# Patient Record
Sex: FEMALE | Race: WHITE | NOT HISPANIC OR LATINO | Employment: UNEMPLOYED | URBAN - METROPOLITAN AREA
[De-identification: names, ages, dates, MRNs, and addresses within clinical notes are randomized per-mention and may not be internally consistent; named-entity substitution may affect disease eponyms.]

---

## 2017-01-13 ENCOUNTER — ALLSCRIPTS OFFICE VISIT (OUTPATIENT)
Dept: OTHER | Facility: OTHER | Age: 57
End: 2017-01-13

## 2017-01-13 DIAGNOSIS — E78.5 HYPERLIPIDEMIA: ICD-10-CM

## 2017-01-13 DIAGNOSIS — Z12.11 ENCOUNTER FOR SCREENING FOR MALIGNANT NEOPLASM OF COLON: ICD-10-CM

## 2017-05-15 DIAGNOSIS — Z12.31 ENCOUNTER FOR SCREENING MAMMOGRAM FOR MALIGNANT NEOPLASM OF BREAST: ICD-10-CM

## 2018-01-13 VITALS
DIASTOLIC BLOOD PRESSURE: 66 MMHG | TEMPERATURE: 97.5 F | WEIGHT: 208 LBS | HEART RATE: 64 BPM | BODY MASS INDEX: 40.84 KG/M2 | RESPIRATION RATE: 16 BRPM | HEIGHT: 60 IN | SYSTOLIC BLOOD PRESSURE: 104 MMHG

## 2018-02-16 RX ORDER — AMLODIPINE BESYLATE 10 MG/1
TABLET ORAL
Qty: 30 TABLET | Refills: 0 | OUTPATIENT
Start: 2018-02-16

## 2018-02-16 RX ORDER — POTASSIUM CHLORIDE 20 MEQ/1
TABLET, EXTENDED RELEASE ORAL
Qty: 30 TABLET | Refills: 0 | OUTPATIENT
Start: 2018-02-16

## 2018-02-16 RX ORDER — ATORVASTATIN CALCIUM 20 MG/1
TABLET, FILM COATED ORAL
Qty: 30 TABLET | Refills: 0 | OUTPATIENT
Start: 2018-02-16

## 2018-02-16 RX ORDER — QUINAPRIL 40 MG/1
TABLET ORAL
Qty: 30 TABLET | Refills: 0 | OUTPATIENT
Start: 2018-02-16

## 2018-03-14 ENCOUNTER — OFFICE VISIT (OUTPATIENT)
Dept: FAMILY MEDICINE CLINIC | Facility: CLINIC | Age: 58
End: 2018-03-14
Payer: COMMERCIAL

## 2018-03-14 VITALS
HEART RATE: 76 BPM | RESPIRATION RATE: 12 BRPM | BODY MASS INDEX: 41.7 KG/M2 | TEMPERATURE: 99.1 F | DIASTOLIC BLOOD PRESSURE: 78 MMHG | SYSTOLIC BLOOD PRESSURE: 110 MMHG | HEIGHT: 60 IN | WEIGHT: 212.4 LBS

## 2018-03-14 DIAGNOSIS — Z12.39 SCREENING FOR BREAST CANCER: ICD-10-CM

## 2018-03-14 DIAGNOSIS — R60.9 EDEMA, UNSPECIFIED TYPE: ICD-10-CM

## 2018-03-14 DIAGNOSIS — Z13.29 SCREENING FOR THYROID DISORDER: ICD-10-CM

## 2018-03-14 DIAGNOSIS — E55.9 VITAMIN D DEFICIENCY: ICD-10-CM

## 2018-03-14 DIAGNOSIS — Z13.31 POSITIVE DEPRESSION SCREENING: ICD-10-CM

## 2018-03-14 DIAGNOSIS — E78.5 HYPERLIPIDEMIA, UNSPECIFIED HYPERLIPIDEMIA TYPE: ICD-10-CM

## 2018-03-14 DIAGNOSIS — Z13.1 SCREENING FOR DIABETES MELLITUS: ICD-10-CM

## 2018-03-14 DIAGNOSIS — R53.83 FATIGUE, UNSPECIFIED TYPE: ICD-10-CM

## 2018-03-14 DIAGNOSIS — M79.7 FIBROMYALGIA: Primary | ICD-10-CM

## 2018-03-14 DIAGNOSIS — Z13.0 SCREENING FOR DEFICIENCY ANEMIA: ICD-10-CM

## 2018-03-14 DIAGNOSIS — I10 HYPERTENSION, UNSPECIFIED TYPE: ICD-10-CM

## 2018-03-14 PROBLEM — K04.7 TOOTH ABSCESS: Status: ACTIVE | Noted: 2017-06-08

## 2018-03-14 PROCEDURE — 99214 OFFICE O/P EST MOD 30 MIN: CPT | Performed by: NURSE PRACTITIONER

## 2018-03-14 RX ORDER — ATORVASTATIN CALCIUM 20 MG/1
1 TABLET, FILM COATED ORAL DAILY
COMMUNITY
Start: 2016-04-19 | End: 2018-03-14 | Stop reason: SDUPTHER

## 2018-03-14 RX ORDER — INCONTINENCE PAD,LINER,DISP
EACH MISCELLANEOUS
COMMUNITY
Start: 2016-09-01 | End: 2018-03-14 | Stop reason: ALTCHOICE

## 2018-03-14 RX ORDER — QUINAPRIL 40 MG/1
1 TABLET ORAL DAILY
COMMUNITY
Start: 2016-04-19 | End: 2018-03-14 | Stop reason: SDUPTHER

## 2018-03-14 RX ORDER — POTASSIUM CHLORIDE 20 MEQ/1
1 TABLET, EXTENDED RELEASE ORAL DAILY
COMMUNITY
Start: 2017-02-23 | End: 2018-03-14 | Stop reason: SDUPTHER

## 2018-03-14 RX ORDER — QUINAPRIL 40 MG/1
40 TABLET ORAL DAILY
Qty: 30 TABLET | Refills: 5 | Status: SHIPPED | OUTPATIENT
Start: 2018-03-14 | End: 2018-05-02 | Stop reason: ALTCHOICE

## 2018-03-14 RX ORDER — FUROSEMIDE 40 MG/1
1 TABLET ORAL DAILY
COMMUNITY
Start: 2017-03-23 | End: 2018-03-14 | Stop reason: SDUPTHER

## 2018-03-14 RX ORDER — MELOXICAM 15 MG/1
1 TABLET ORAL DAILY
COMMUNITY
Start: 2016-04-19 | End: 2018-03-14 | Stop reason: ALTCHOICE

## 2018-03-14 RX ORDER — METOPROLOL SUCCINATE 100 MG/1
100 TABLET, EXTENDED RELEASE ORAL DAILY
Qty: 30 TABLET | Refills: 5 | Status: SHIPPED | OUTPATIENT
Start: 2018-03-14 | End: 2018-08-22 | Stop reason: SDUPTHER

## 2018-03-14 RX ORDER — FUROSEMIDE 40 MG/1
40 TABLET ORAL DAILY
Qty: 30 TABLET | Refills: 5 | Status: SHIPPED | OUTPATIENT
Start: 2018-03-14 | End: 2018-08-22 | Stop reason: SDUPTHER

## 2018-03-14 RX ORDER — ATORVASTATIN CALCIUM 20 MG/1
20 TABLET, FILM COATED ORAL DAILY
Qty: 30 TABLET | Refills: 5 | Status: SHIPPED | OUTPATIENT
Start: 2018-03-14 | End: 2018-08-22 | Stop reason: SDUPTHER

## 2018-03-14 RX ORDER — POTASSIUM CHLORIDE 20 MEQ/1
20 TABLET, EXTENDED RELEASE ORAL DAILY
Qty: 30 TABLET | Refills: 5 | Status: SHIPPED | OUTPATIENT
Start: 2018-03-14 | End: 2018-08-22 | Stop reason: SDUPTHER

## 2018-03-14 RX ORDER — AMLODIPINE BESYLATE 10 MG/1
1 TABLET ORAL DAILY
COMMUNITY
Start: 2016-05-23 | End: 2018-03-14 | Stop reason: SDUPTHER

## 2018-03-14 RX ORDER — ERGOCALCIFEROL 1.25 MG/1
1 CAPSULE ORAL WEEKLY
COMMUNITY
Start: 2016-11-04 | End: 2018-03-14 | Stop reason: ALTCHOICE

## 2018-03-14 RX ORDER — AMLODIPINE BESYLATE 10 MG/1
10 TABLET ORAL DAILY
Qty: 30 TABLET | Refills: 5 | Status: SHIPPED | OUTPATIENT
Start: 2018-03-14 | End: 2018-08-22 | Stop reason: SDUPTHER

## 2018-03-14 RX ORDER — METOPROLOL SUCCINATE 100 MG/1
1 TABLET, EXTENDED RELEASE ORAL DAILY
COMMUNITY
Start: 2017-07-21 | End: 2018-03-14 | Stop reason: SDUPTHER

## 2018-03-14 NOTE — PROGRESS NOTES
Assessment/Plan:    No problem-specific Assessment & Plan notes found for this encounter  Diagnoses and all orders for this visit:    Fibromyalgia  Comments:  Pt has trigger point pain will continue to discuss management following labwork completion    Screening for deficiency anemia  Comments:  Labwork ordered  Orders:  -     CBC and differential; Future    Screening for diabetes mellitus  Comments:  Labwork ordered  Orders:  -     Comprehensive metabolic panel; Future    Hypertension, unspecified type  Comments:  /78 pt managed on 3 agents, Accupril, Metoprolol and Norvasc  Orders:  -     Comprehensive metabolic panel; Future  -     UA (URINE) with reflex to Microscopic  -     amLODIPine (NORVASC) 10 mg tablet; Take 1 tablet (10 mg total) by mouth daily  -     metoprolol succinate (TOPROL-XL) 100 mg 24 hr tablet; Take 1 tablet (100 mg total) by mouth daily  -     quinapril (ACCUPRIL) 40 MG tablet; Take 1 tablet (40 mg total) by mouth daily    Hyperlipidemia, unspecified hyperlipidemia type  Comments:  Pt managed on Atorvastatin  Labwork ordered  Orders:  -     Lipid panel; Future  -     atorvastatin (LIPITOR) 20 mg tablet; Take 1 tablet (20 mg total) by mouth daily    Screening for thyroid disorder  Comments:  Labwork ordered  Orders:  -     TSH, 3rd generation with T4 reflex; Future    Vitamin D deficiency  Comments:  Labwork ordered  Orders:  -     Vitamin D 25 hydroxy; Future    Fatigue, unspecified type  Comments:  Labwork ordered  Orders:  -     UA (URINE) with reflex to Microscopic    Screening for breast cancer  Comments:  mammogram ordered  Orders:  -     Mammo screening bilateral w cad; Future    Edema, unspecified type  Comments:  Pt reports history of unknown cardiac condition, need her medical records  Pt maintained on Lasix and Potassium  Orders:  -     furosemide (LASIX) 40 mg tablet; Take 1 tablet (40 mg total) by mouth daily  -     potassium chloride (K-DUR,KLOR-CON) 20 mEq tablet;  Take 1 tablet (20 mEq total) by mouth daily    Positive depression screening  Comments:  Discussed use of antidepressants but patient would like to think about it  Pt refusing counseling because she has no transporatation , no car  Other orders  -     Discontinue: amLODIPine (NORVASC) 10 mg tablet; Take 1 tablet by mouth daily  -     Discontinue: atorvastatin (LIPITOR) 20 mg tablet; Take 1 tablet by mouth daily  -     Discontinue: furosemide (LASIX) 40 mg tablet; Take 1 tablet by mouth daily  -     Discontinue: meloxicam (MOBIC) 15 mg tablet; Take 1 tablet by mouth daily  -     Discontinue: metoprolol succinate (TOPROL-XL) 100 mg 24 hr tablet; Take 1 tablet by mouth daily  -     Discontinue: Incontinence Supply Disposable (POISE PANTILINERS) PADS; by Does not apply route  -     Discontinue: potassium chloride (K-DUR,KLOR-CON) 20 mEq tablet; Take 1 tablet by mouth daily  -     Discontinue: quinapril (ACCUPRIL) 40 MG tablet; Take 1 tablet by mouth daily  -     Discontinue: ergocalciferol (VITAMIN D2) 50,000 units; Take 1 capsule by mouth once a week          Subjective:      Patient ID: Vianney Sierra is a 61 y o  female  presents for 6 month f/u  chronic pain multiple joints, depression, hyperlipidemia,  and hypertension  Pt does not wish to take any medication stating she is worried about the long term effects on her liver and her kidneys  Pt continues to smoke 1 ppd  Pt has been applying for disability and recently was denied after 8 years  Pt reports she is unable to work due to chronic joint pain  , does not have a car and has limited transportation        Hypertension   Pertinent negatives include no chest pain, headaches, palpitations or shortness of breath  The following portions of the patient's history were reviewed and updated as appropriate:   She  has a past medical history of ; Hyperlipidemia; Hypertension; Seizures ([address] ); Thrombosis; and Vitamin D deficiency    She   Patient Active Problem List    Diagnosis Date Noted    Fibromyalgia 03/14/2018    Tooth abscess 06/08/2017    Positive depression screening 01/13/2017    Vitamin D insufficiency 11/04/2016    Bilateral chronic knee pain 09/30/2016    Knee osteoarthritis 09/30/2016    Low back pain potentially associated with radiculopathy 09/30/2016    Myalgia 09/30/2016    Urinary incontinence 09/01/2016    Acute bronchitis 07/06/2016    Tracheobronchitis 05/19/2016    Arthritis of back 04/19/2016    Benign essential hypertension 04/19/2016    Bilateral knee pain 04/19/2016    Chronic lower back pain 04/19/2016    Chronic obstructive pulmonary disease ([facility] [address] ) 04/19/2016    Disc degeneration, lumbar 04/19/2016    Fibrocystic breast disease, unspecified laterality 04/19/2016    Generalized osteoarthritis of multiple sites 04/19/2016    Herniated lumbar intervertebral disc 04/19/2016    Hyperlipidemia 04/19/2016    Menopause 04/19/2016    Obstructive sleep apnea 04/19/2016     She  has a past surgical history that includes Hysterectomy and Incisional breast biopsy  Her family history includes Breast cancer in her family and sister; Heart attack in her father; Hypertension in her mother; Lung cancer in her family; Stomach cancer in her mother  She  reports that she has been smoking Cigarettes  She has a 13 00 pack-year smoking history  She has never used smokeless tobacco  She reports that she drinks alcohol  She reports that she does not use drugs    Current Outpatient Prescriptions   Medication Sig Dispense Refill    amLODIPine (NORVASC) 10 mg tablet Take 1 tablet (10 mg total) by mouth daily 30 tablet 5    atorvastatin (LIPITOR) 20 mg tablet Take 1 tablet (20 mg total) by mouth daily 30 tablet 5    furosemide (LASIX) 40 mg tablet Take 1 tablet (40 mg total) by mouth daily 30 tablet 5    metoprolol succinate (TOPROL-XL) 100 mg 24 hr tablet Take 1 tablet (100 mg total) by mouth daily 30 tablet 5    potassium chloride (K-DUR,KLOR-CON) 20 mEq tablet Take 1 tablet (20 mEq total) by mouth daily 30 tablet 5    quinapril (ACCUPRIL) 40 MG tablet Take 1 tablet (40 mg total) by mouth daily 30 tablet 5     No current facility-administered medications for this visit  No current outpatient prescriptions on file prior to visit  No current facility-administered medications on file prior to visit  She is allergic to benadryl [diphenhydramine] and tetracycline       Review of Systems   Constitutional: Negative  Negative for fatigue  HENT: Negative  Negative for congestion, postnasal drip, rhinorrhea, sinus pain, sore throat and trouble swallowing  Eyes: Negative  Negative for pain and visual disturbance  Respiratory: Negative  Negative for cough, shortness of breath and wheezing  Cardiovascular: Negative  Negative for chest pain and palpitations  Gastrointestinal: Negative  Negative for constipation, diarrhea, nausea and vomiting  Endocrine: Negative  Negative for polydipsia, polyphagia and polyuria  Genitourinary: Negative  Negative for difficulty urinating, flank pain, frequency and pelvic pain  Musculoskeletal: Positive for arthralgias, back pain, joint swelling and myalgias  Skin: Negative  Negative for color change and rash  Allergic/Immunologic: Negative  Neurological: Negative  Negative for dizziness, syncope, weakness, light-headedness, numbness and headaches  Hematological: Negative  Negative for adenopathy  Does not bruise/bleed easily  Psychiatric/Behavioral: Positive for sleep disturbance  Negative for behavioral problems  The patient is not nervous/anxious  Objective:      /78 (BP Location: Left arm, Patient Position: Sitting, Cuff Size: Large)   Pulse 76   Temp 99 1 °F (37 3 °C) (Tympanic)   Resp 12   Ht 5' (1 524 m)   Wt 96 3 kg (212 lb 6 4 oz)   BMI 41 48 kg/m²          Physical Exam   Constitutional: She is oriented to person, place, and time   She appears well-developed and well-nourished  HENT:   Head: Normocephalic  Eyes: Pupils are equal, round, and reactive to light  Neck: Normal range of motion  Cardiovascular: Normal rate and regular rhythm  Pulmonary/Chest: Effort normal and breath sounds normal    Abdominal: Soft  Bowel sounds are normal    Musculoskeletal: Normal range of motion  Neurological: She is alert and oriented to person, place, and time  Skin: Skin is warm and dry  Psychiatric: Her behavior is normal  Judgment and thought content normal  She exhibits a depressed mood  Nursing note and vitals reviewed

## 2018-03-14 NOTE — PATIENT INSTRUCTIONS
Pregabalin (By mouth)   Pregabalin (pre-GA-ba-cresencio)  Treats nerve and muscle pain, including fibromyalgia  Also treats seizures  Brand Name(s): Lyrica   There may be other brand names for this medicine  When This Medicine Should Not Be Used: This medicine is not right for everyone  Do not use it if you had an allergic reaction to pregabalin  How to Use This Medicine:   Capsule, Liquid  · Take your medicine as directed  Your dose may need to be changed several times to find what works best for you  · Measure the oral liquid medicine with a marked measuring spoon, oral syringe, or medicine cup  · This medicine should come with a Medication Guide  Ask your pharmacist for a copy if you do not have one  · Missed dose: Take a dose as soon as you remember  If it is almost time for your next dose, wait until then and take a regular dose  Do not take extra medicine to make up for a missed dose  · Store the medicine in a closed container at room temperature, away from heat, moisture, and direct light  Drugs and Foods to Avoid:   Ask your doctor or pharmacist before using any other medicine, including over-the-counter medicines, vitamins, and herbal products  · Some medicines can affect how pregabalin works  Tell your doctor if you are using any of the following:   ¨ Diabetes medicine that you take by mouth (pioglitazone, rosiglitazone)  ¨ An ACE inhibitor (benazepril, enalapril, lisinopril, quinapril, ramipril)  · Tell your doctor if you use anything else that makes you sleepy  Some examples are allergy medicine, narcotic pain medicine, and alcohol  Warnings While Using This Medicine:   · Tell your doctor if you are pregnant or breastfeeding, or if you have kidney disease, heart failure, heart rhythm problems, angioedema, a bleeding disorder, or a low blood platelet count  Tell your doctor if you have a history of alcohol or drug abuse, depression, or other mood problems    · This medicine may cause the following problems:   ¨ Serious allergic reactions  ¨ Suicidal thoughts  · This medicine may make you dizzy or drowsy  It may also cause blurry or double vision  Do not drive or do anything that could be dangerous until you know how this medicine affects you  · Do not stop using this medicine suddenly  Your doctor will need to slowly decrease your dose before you stop it completely  · Your doctor will check your progress and the effects of this medicine at regular visits  Keep all appointments  · Keep all medicine out of the reach of children  Never share your medicine with anyone  Possible Side Effects While Using This Medicine:   Call your doctor right away if you notice any of these side effects:  · Allergic reaction: Itching or hives, swelling in your face or hands, swelling or tingling in your mouth or throat, chest tightness, trouble breathing  · Blistering, peeling, red skin rash  · Blurry or double vision  · Fever, chills, cough, sore throat, and body aches  · Muscle pain, tenderness, or weakness, fever, or general ill feeling  · Rapid weight gain, swelling in your hands, ankles, or feet  · Severe dizziness or drowsiness  · Sudden mood changes, unusual thoughts or behavior such as extreme happiness or depression  · Suicidal thoughts  · Swelling in your throat, head, or neck  · Uneven heartbeat  · Unusual bleeding, bruising, or weakness  If you notice these less serious side effects, talk with your doctor:   · Confusion, trouble concentrating  · Constipation  · Dry mouth  If you notice other side effects that you think are caused by this medicine, tell your doctor  Call your doctor for medical advice about side effects  You may report side effects to FDA at 9-843-FDA-9463  © 2017 [employer] Information is for End User's use only and may not be sold, redistributed or otherwise used for commercial purposes  The above information is an  only   It is not intended as medical advice for individual conditions or treatments  Talk to your doctor, nurse or pharmacist before following any medical regimen to see if it is safe and effective for you  Fibromyalgia   WHAT YOU NEED TO KNOW:   What is fibromyalgia? Fibromyalgia is a long-term condition that causes pain and tender points throughout your body  Fibromyalgia can start at any age and is more common in women than in men  What causes fibromyalgia? Healthcare providers do not know exactly what causes fibromyalgia  Problems with chemicals that send pain messages to and from the brain are thought to cause fibromyalgia  It may also be caused or triggered by any of the following:  · Hormone changes    · Physical injury    · Intense emotional trauma from sexual, physical, or emotional abuse  What are the signs and symptoms of fibromyalgia? The most common symptom of fibromyalgia is widespread pain for at least 3 months  You may also have tender spots  Tender spots are specific areas or points on both sides of your body that are painful when pressed  You may have tender spots in your neck, upper chest, shoulders, or shoulder blades  Other common areas are the elbows, lower back, sides of the thighs, and knees  You may also have any of the following:  · Fatigue and difficulty sleeping    · Diarrhea, constipation, pain, or bloating    · Headaches, memory problems, difficulty concentrating, or anxiety    · Numbness, muscle stiffness, or swelling of the hands and feet    · Pounding, racing heartbeats or chest pain  How is fibromyalgia diagnosed? Your healthcare provider will examine you and ask about your symptoms and other health conditions  He will do a manual tender point exam and press on specific sites or points in your body  Increased pain in most of these spots means a positive tender point exam  There are no specific lab tests to diagnose fibromyalgia   Blood and urine tests, a spinal tap, or sleep studies may be done to rule out other causes of pain   How is fibromyalgia treated? Fibromyalgia can be treated but not cured  The following can help you manage your pain and other symptoms:  · Acetaminophen and ibuprofen: These medicines decrease pain  They are available without a doctor's order  Ask your healthcare provider which medicine is right for you  Ask how much to take and how often to take it  Follow directions  These medicines can cause stomach bleeding if not taken correctly  Ibuprofen can cause kidney damage  Acetaminophen can cause liver damage  · Pain medicine: You may be given a prescription medicine to decrease pain  Do not wait until the pain is severe before you take this medicine  · Muscle relaxers  help decrease pain and muscle spasms  · Antidepressants: These help decrease depression, pain, and fatigue  · Antiseizure medicine: This is used to reduce fibromyalgia pain  What are the risks of fibromyalgia? If untreated, your symptoms may get worse  Pain may make it difficult to do daily activities  Your risk for fatigue, headaches, and depression may increase  How can I manage my symptoms? · Keep a pain diary:  Record your symptoms and what activity caused them  This may also help you track pain cycles and show a pattern to your symptoms  · Exercise:  Ask your healthcare provider about the best exercise plan for you  Exercise and other strength-training activities may decrease pain and sleep problems  · Set good sleep habits:  Do not nap during the day  Go to bed at the same time each night  Make sure your bedroom is dark, quiet, and comfortable  Do not stay in bed if you cannot sleep  Get up and do something relaxing until you are sleepy  Do not drink caffeine or alcohol right before you go to bed  These can make it difficult for you to sleep  Limit other liquids to help decrease your need to urinate in the night  Where can I find support and more information?    · National Chronic Fatigue Syndrome and [address] [facility] , [address] [place] [address]  Phone: 5- 413 - 882-0433  Web Address: [employer] ee  org  When should I contact my healthcare provider? · You pain increases, even after you take your pain medicine  · You have difficulty sleeping  · You have questions or concerns about your condition or care  When should I seek immediate care or call 911? · You are depressed and feel you cannot cope with your condition  CARE AGREEMENT:   You have the right to help plan your care  Learn about your health condition and how it may be treated  Discuss treatment options with your caregivers to decide what care you want to receive  You always have the right to refuse treatment  The above information is an  only  It is not intended as medical advice for individual conditions or treatments  Talk to your doctor, nurse or pharmacist before following any medical regimen to see if it is safe and effective for you  © 2017 [employer] Information is for End User's use only and may not be sold, redistributed or otherwise used for commercial purposes  All illustrations and images included in CareNotes® are the copyrighted property of [employer]  or Harvinder Briseno

## 2018-04-12 ENCOUNTER — TRANSCRIBE ORDERS (OUTPATIENT)
Dept: LAB | Facility: CLINIC | Age: 58
End: 2018-04-12

## 2018-04-12 ENCOUNTER — APPOINTMENT (OUTPATIENT)
Dept: LAB | Facility: CLINIC | Age: 58
End: 2018-04-12
Payer: COMMERCIAL

## 2018-04-12 DIAGNOSIS — Z13.29 SCREENING FOR THYROID DISORDER: ICD-10-CM

## 2018-04-12 DIAGNOSIS — E55.9 VITAMIN D DEFICIENCY: ICD-10-CM

## 2018-04-12 DIAGNOSIS — I10 HYPERTENSION, UNSPECIFIED TYPE: ICD-10-CM

## 2018-04-12 DIAGNOSIS — E78.5 HYPERLIPIDEMIA, UNSPECIFIED HYPERLIPIDEMIA TYPE: ICD-10-CM

## 2018-04-12 DIAGNOSIS — Z13.1 SCREENING FOR DIABETES MELLITUS: ICD-10-CM

## 2018-04-12 DIAGNOSIS — Z13.0 SCREENING FOR DEFICIENCY ANEMIA: ICD-10-CM

## 2018-04-12 LAB
25(OH)D3 SERPL-MCNC: 12.9 NG/ML (ref 30–100)
ALBUMIN SERPL BCP-MCNC: 3.5 G/DL (ref 3.5–5)
ALP SERPL-CCNC: 104 U/L (ref 46–116)
ALT SERPL W P-5'-P-CCNC: 16 U/L (ref 12–78)
ANION GAP SERPL CALCULATED.3IONS-SCNC: 5 MMOL/L (ref 4–13)
AST SERPL W P-5'-P-CCNC: 10 U/L (ref 5–45)
BACTERIA UR QL AUTO: NORMAL /HPF
BASOPHILS # BLD AUTO: 0.08 THOUSANDS/ΜL (ref 0–0.1)
BASOPHILS NFR BLD AUTO: 1 % (ref 0–1)
BILIRUB SERPL-MCNC: 0.46 MG/DL (ref 0.2–1)
BILIRUB UR QL STRIP: NEGATIVE
BUN SERPL-MCNC: 7 MG/DL (ref 5–25)
CALCIUM SERPL-MCNC: 8.6 MG/DL
CHLORIDE SERPL-SCNC: 106 MMOL/L (ref 100–108)
CHOLEST SERPL-MCNC: 162 MG/DL (ref 50–200)
CLARITY UR: CLEAR
CO2 SERPL-SCNC: 28 MMOL/L (ref 21–32)
COLOR UR: YELLOW
CREAT SERPL-MCNC: 0.74 MG/DL (ref 0.6–1.3)
EOSINOPHIL # BLD AUTO: 0.29 THOUSAND/ΜL (ref 0–0.61)
EOSINOPHIL NFR BLD AUTO: 3 % (ref 0–6)
ERYTHROCYTE [DISTWIDTH] IN BLOOD BY AUTOMATED COUNT: 13.6 % (ref 11.6–15.1)
GFR SERPL CREATININE-BSD FRML MDRD: 90 ML/MIN/1.73SQ M
GLUCOSE P FAST SERPL-MCNC: 84 MG/DL (ref 65–99)
GLUCOSE UR STRIP-MCNC: NEGATIVE MG/DL
HCT VFR BLD AUTO: 49.7 % (ref 34.8–46.1)
HDLC SERPL-MCNC: 55 MG/DL (ref 40–60)
HGB BLD-MCNC: 16.7 G/DL (ref 11.5–15.4)
HGB UR QL STRIP.AUTO: ABNORMAL
HYALINE CASTS #/AREA URNS LPF: NORMAL /LPF
KETONES UR STRIP-MCNC: NEGATIVE MG/DL
LDLC SERPL CALC-MCNC: 88 MG/DL (ref 0–100)
LEUKOCYTE ESTERASE UR QL STRIP: NEGATIVE
LYMPHOCYTES # BLD AUTO: 3.69 THOUSANDS/ΜL (ref 0.6–4.47)
LYMPHOCYTES NFR BLD AUTO: 32 % (ref 14–44)
MCH RBC QN AUTO: 30.8 PG (ref 26.8–34.3)
MCHC RBC AUTO-ENTMCNC: 33.6 G/DL (ref 31.4–37.4)
MCV RBC AUTO: 92 FL (ref 82–98)
MONOCYTES # BLD AUTO: 0.73 THOUSAND/ΜL (ref 0.17–1.22)
MONOCYTES NFR BLD AUTO: 6 % (ref 4–12)
NEUTROPHILS # BLD AUTO: 6.56 THOUSANDS/ΜL (ref 1.85–7.62)
NEUTS SEG NFR BLD AUTO: 58 % (ref 43–75)
NITRITE UR QL STRIP: NEGATIVE
NON-SQ EPI CELLS URNS QL MICRO: NORMAL /HPF
NONHDLC SERPL-MCNC: 107 MG/DL
NRBC BLD AUTO-RTO: 0 /100 WBCS
PH UR STRIP.AUTO: 7.5 [PH] (ref 4.5–8)
PLATELET # BLD AUTO: 348 THOUSANDS/UL (ref 149–390)
PMV BLD AUTO: 10.4 FL (ref 8.9–12.7)
POTASSIUM SERPL-SCNC: 3.8 MMOL/L (ref 3.5–5.3)
PROT SERPL-MCNC: 7.7 G/DL (ref 6.4–8.2)
PROT UR STRIP-MCNC: NEGATIVE MG/DL
RBC # BLD AUTO: 5.42 MILLION/UL (ref 3.81–5.12)
RBC #/AREA URNS AUTO: NORMAL /HPF
SODIUM SERPL-SCNC: 139 MMOL/L (ref 136–145)
SP GR UR STRIP.AUTO: 1.01 (ref 1–1.03)
TRIGL SERPL-MCNC: 97 MG/DL
TSH SERPL DL<=0.05 MIU/L-ACNC: 3.33 UIU/ML (ref 0.36–3.74)
UROBILINOGEN UR QL STRIP.AUTO: 0.2 E.U./DL
WBC # BLD AUTO: 11.38 THOUSAND/UL (ref 4.31–10.16)
WBC #/AREA URNS AUTO: NORMAL /HPF

## 2018-04-12 PROCEDURE — 85025 COMPLETE CBC W/AUTO DIFF WBC: CPT

## 2018-04-12 PROCEDURE — 36415 COLL VENOUS BLD VENIPUNCTURE: CPT

## 2018-04-12 PROCEDURE — 80061 LIPID PANEL: CPT

## 2018-04-12 PROCEDURE — 84443 ASSAY THYROID STIM HORMONE: CPT

## 2018-04-12 PROCEDURE — 80053 COMPREHEN METABOLIC PANEL: CPT

## 2018-04-12 PROCEDURE — 82306 VITAMIN D 25 HYDROXY: CPT

## 2018-04-12 PROCEDURE — 81001 URINALYSIS AUTO W/SCOPE: CPT | Performed by: NURSE PRACTITIONER

## 2018-04-18 ENCOUNTER — OFFICE VISIT (OUTPATIENT)
Dept: FAMILY MEDICINE CLINIC | Facility: CLINIC | Age: 58
End: 2018-04-18
Payer: COMMERCIAL

## 2018-04-18 ENCOUNTER — TRANSCRIBE ORDERS (OUTPATIENT)
Dept: LAB | Facility: CLINIC | Age: 58
End: 2018-04-18

## 2018-04-18 ENCOUNTER — APPOINTMENT (OUTPATIENT)
Dept: LAB | Facility: CLINIC | Age: 58
End: 2018-04-18
Payer: COMMERCIAL

## 2018-04-18 VITALS
SYSTOLIC BLOOD PRESSURE: 118 MMHG | WEIGHT: 217 LBS | DIASTOLIC BLOOD PRESSURE: 78 MMHG | RESPIRATION RATE: 20 BRPM | HEIGHT: 60 IN | TEMPERATURE: 98.9 F | HEART RATE: 88 BPM | BODY MASS INDEX: 42.6 KG/M2

## 2018-04-18 DIAGNOSIS — M25.50 ARTHRALGIA, UNSPECIFIED JOINT: ICD-10-CM

## 2018-04-18 DIAGNOSIS — D75.1 POLYCYTHEMIA: ICD-10-CM

## 2018-04-18 DIAGNOSIS — N02.9 IDIOPATHIC HEMATURIA WITH GLOMERULAR MORPHOLOGIC CHANGES: ICD-10-CM

## 2018-04-18 DIAGNOSIS — Z12.11 SCREENING FOR COLON CANCER: Primary | ICD-10-CM

## 2018-04-18 DIAGNOSIS — E55.9 VITAMIN D DEFICIENCY: ICD-10-CM

## 2018-04-18 LAB
BACTERIA UR QL AUTO: ABNORMAL /HPF
BASOPHILS # BLD AUTO: 0.06 THOUSANDS/ΜL (ref 0–0.1)
BASOPHILS NFR BLD AUTO: 1 % (ref 0–1)
BILIRUB UR QL STRIP: NEGATIVE
CLARITY UR: CLEAR
COLOR UR: YELLOW
CREAT UR-MCNC: 128 MG/DL
EOSINOPHIL # BLD AUTO: 0.23 THOUSAND/ΜL (ref 0–0.61)
EOSINOPHIL NFR BLD AUTO: 2 % (ref 0–6)
ERYTHROCYTE [DISTWIDTH] IN BLOOD BY AUTOMATED COUNT: 13.4 % (ref 11.6–15.1)
ERYTHROCYTE [SEDIMENTATION RATE] IN BLOOD: 8 MM/HOUR (ref 0–20)
GLUCOSE UR STRIP-MCNC: NEGATIVE MG/DL
HCT VFR BLD AUTO: 49.7 % (ref 34.8–46.1)
HGB BLD-MCNC: 16.9 G/DL (ref 11.5–15.4)
HGB UR QL STRIP.AUTO: ABNORMAL
HYALINE CASTS #/AREA URNS LPF: ABNORMAL /LPF
KETONES UR STRIP-MCNC: NEGATIVE MG/DL
LEUKOCYTE ESTERASE UR QL STRIP: NEGATIVE
LYMPHOCYTES # BLD AUTO: 3.58 THOUSANDS/ΜL (ref 0.6–4.47)
LYMPHOCYTES NFR BLD AUTO: 38 % (ref 14–44)
MCH RBC QN AUTO: 30.8 PG (ref 26.8–34.3)
MCHC RBC AUTO-ENTMCNC: 34 G/DL (ref 31.4–37.4)
MCV RBC AUTO: 91 FL (ref 82–98)
MICROALBUMIN UR-MCNC: 40.9 MG/L (ref 0–20)
MICROALBUMIN/CREAT 24H UR: 32 MG/G CREATININE (ref 0–30)
MONOCYTES # BLD AUTO: 0.56 THOUSAND/ΜL (ref 0.17–1.22)
MONOCYTES NFR BLD AUTO: 6 % (ref 4–12)
NEUTROPHILS # BLD AUTO: 5.1 THOUSANDS/ΜL (ref 1.85–7.62)
NEUTS SEG NFR BLD AUTO: 53 % (ref 43–75)
NITRITE UR QL STRIP: NEGATIVE
NON-SQ EPI CELLS URNS QL MICRO: ABNORMAL /HPF
NRBC BLD AUTO-RTO: 0 /100 WBCS
PH UR STRIP.AUTO: 6.5 [PH] (ref 4.5–8)
PLATELET # BLD AUTO: 339 THOUSANDS/UL (ref 149–390)
PMV BLD AUTO: 10.2 FL (ref 8.9–12.7)
PROT UR STRIP-MCNC: ABNORMAL MG/DL
RBC # BLD AUTO: 5.48 MILLION/UL (ref 3.81–5.12)
RBC #/AREA URNS AUTO: ABNORMAL /HPF
SP GR UR STRIP.AUTO: 1.02 (ref 1–1.03)
UROBILINOGEN UR QL STRIP.AUTO: 0.2 E.U./DL
WBC # BLD AUTO: 9.55 THOUSAND/UL (ref 4.31–10.16)
WBC #/AREA URNS AUTO: ABNORMAL /HPF

## 2018-04-18 PROCEDURE — 81001 URINALYSIS AUTO W/SCOPE: CPT | Performed by: NURSE PRACTITIONER

## 2018-04-18 PROCEDURE — 86225 DNA ANTIBODY NATIVE: CPT

## 2018-04-18 PROCEDURE — 86162 COMPLEMENT TOTAL (CH50): CPT

## 2018-04-18 PROCEDURE — 86160 COMPLEMENT ANTIGEN: CPT

## 2018-04-18 PROCEDURE — 85652 RBC SED RATE AUTOMATED: CPT

## 2018-04-18 PROCEDURE — 85025 COMPLETE CBC W/AUTO DIFF WBC: CPT

## 2018-04-18 PROCEDURE — 86038 ANTINUCLEAR ANTIBODIES: CPT

## 2018-04-18 PROCEDURE — 82043 UR ALBUMIN QUANTITATIVE: CPT

## 2018-04-18 PROCEDURE — 86430 RHEUMATOID FACTOR TEST QUAL: CPT

## 2018-04-18 PROCEDURE — 86235 NUCLEAR ANTIGEN ANTIBODY: CPT

## 2018-04-18 PROCEDURE — 82570 ASSAY OF URINE CREATININE: CPT

## 2018-04-18 PROCEDURE — 86618 LYME DISEASE ANTIBODY: CPT

## 2018-04-18 PROCEDURE — 36415 COLL VENOUS BLD VENIPUNCTURE: CPT

## 2018-04-18 PROCEDURE — 99214 OFFICE O/P EST MOD 30 MIN: CPT | Performed by: NURSE PRACTITIONER

## 2018-04-18 RX ORDER — ERGOCALCIFEROL 1.25 MG/1
50000 CAPSULE ORAL WEEKLY
Qty: 12 CAPSULE | Refills: 3 | Status: SHIPPED | OUTPATIENT
Start: 2018-04-18 | End: 2018-08-22 | Stop reason: SDUPTHER

## 2018-04-18 NOTE — PROGRESS NOTES
Assessment/Plan:    No problem-specific Assessment & Plan notes found for this encounter  There are no diagnoses linked to this encounter  Subjective:      Patient ID: Eloisa Crook is a 61 y o  female  here for lab review and symptom management hospital  WBC 11   38  Was 11 0    H&H 16 7 49 7  Was 15 5 46 6 in 2016  Pt is 1ppd smoker suspect pt may have Polycythemia  Pts mother also has this condition and did have blood donated monthly  UA has trace blood no protein   Microalbumin 4 0 high and Microalbumin 37 0 also elevated in 2016  Will repeat test within the next month  HPI    The following portions of the patient's history were reviewed and updated as appropriate:   She  has a past medical history of ; Hyperlipidemia; Hypertension; Seizures ([facility] [address] ); Thrombosis; and Vitamin D deficiency  She   Patient Active Problem List    Diagnosis Date Noted    Fibromyalgia 2018    Tooth abscess 2017    Positive depression screening 2017    Vitamin D insufficiency 2016    Bilateral chronic knee pain 2016    Knee osteoarthritis 2016    Low back pain potentially associated with radiculopathy 2016    Myalgia 2016    Urinary incontinence 2016    Acute bronchitis 2016    Tracheobronchitis 2016    Arthritis of back 2016    Benign essential hypertension 2016    Bilateral knee pain 2016    Chronic lower back pain 2016    Chronic obstructive pulmonary disease ([facility] [address] ) 2016    Disc degeneration, lumbar 2016    Fibrocystic breast disease, unspecified laterality 2016    Generalized osteoarthritis of multiple sites 2016    Herniated lumbar intervertebral disc 2016    Hyperlipidemia 2016    Menopause 2016    Obstructive sleep apnea 2016     She  has a past surgical history that includes Hysterectomy and Incisional breast biopsy    Her family history includes Breast cancer in her family and sister; Heart attack in her father; Hypertension in her mother; Lung cancer in her family; Stomach cancer in her mother  She  reports that she has been smoking Cigarettes  She has a 13 00 pack-year smoking history  She has never used smokeless tobacco  She reports that she drinks alcohol  She reports that she does not use drugs  Current Outpatient Prescriptions   Medication Sig Dispense Refill    amLODIPine (NORVASC) 10 mg tablet Take 1 tablet (10 mg total) by mouth daily 30 tablet 5    atorvastatin (LIPITOR) 20 mg tablet Take 1 tablet (20 mg total) by mouth daily 30 tablet 5    furosemide (LASIX) 40 mg tablet Take 1 tablet (40 mg total) by mouth daily 30 tablet 5    metoprolol succinate (TOPROL-XL) 100 mg 24 hr tablet Take 1 tablet (100 mg total) by mouth daily 30 tablet 5    potassium chloride (K-DUR,KLOR-CON) 20 mEq tablet Take 1 tablet (20 mEq total) by mouth daily 30 tablet 5    quinapril (ACCUPRIL) 40 MG tablet Take 1 tablet (40 mg total) by mouth daily 30 tablet 5     No current facility-administered medications for this visit  Current Outpatient Prescriptions on File Prior to Visit   Medication Sig    amLODIPine (NORVASC) 10 mg tablet Take 1 tablet (10 mg total) by mouth daily    atorvastatin (LIPITOR) 20 mg tablet Take 1 tablet (20 mg total) by mouth daily    furosemide (LASIX) 40 mg tablet Take 1 tablet (40 mg total) by mouth daily    metoprolol succinate (TOPROL-XL) 100 mg 24 hr tablet Take 1 tablet (100 mg total) by mouth daily    potassium chloride (K-DUR,KLOR-CON) 20 mEq tablet Take 1 tablet (20 mEq total) by mouth daily    quinapril (ACCUPRIL) 40 MG tablet Take 1 tablet (40 mg total) by mouth daily     No current facility-administered medications on file prior to visit  She is allergic to benadryl [diphenhydramine] and tetracycline       Review of Systems   Constitutional: Positive for fatigue  HENT: Negative    Negative for congestion, postnasal drip, rhinorrhea, sinus pain, sore throat and trouble swallowing  Eyes: Negative  Negative for pain and visual disturbance  Respiratory: Negative  Negative for cough, shortness of breath and wheezing  Cardiovascular: Negative  Negative for chest pain and palpitations  Gastrointestinal: Negative  Negative for constipation, diarrhea, nausea and vomiting  Endocrine: Negative  Negative for polydipsia, polyphagia and polyuria  Genitourinary: Negative  Negative for difficulty urinating, flank pain, frequency and pelvic pain  Musculoskeletal: Positive for arthralgias and myalgias  Negative for back pain and joint swelling  Skin: Negative  Negative for color change and rash  Allergic/Immunologic: Negative  Neurological: Negative  Negative for dizziness, syncope, weakness, light-headedness, numbness and headaches  Hematological: Negative  Negative for adenopathy  Does not bruise/bleed easily  Psychiatric/Behavioral: Negative  Negative for behavioral problems and sleep disturbance  The patient is not nervous/anxious  Objective: There were no vitals taken for this visit  Physical Exam   Constitutional: She is oriented to person, place, and time  She appears well-developed and well-nourished  HENT:   Head: Normocephalic  Eyes: Pupils are equal, round, and reactive to light  Neck: Normal range of motion  Cardiovascular: Normal rate and regular rhythm  Pulmonary/Chest: Effort normal and breath sounds normal    Abdominal: Soft  Bowel sounds are normal    Musculoskeletal: Normal range of motion  Neurological: She is alert and oriented to person, place, and time  Skin: Skin is warm and dry  Psychiatric: She has a normal mood and affect  Her behavior is normal  Judgment and thought content normal    Nursing note and vitals reviewed

## 2018-04-18 NOTE — PATIENT INSTRUCTIONS
Polycythemia Vera   AMBULATORY CARE:   Polycythemia vera (PV)  is a condition that causes your bone marrow to produce too many red blood cells (RBCs)  RBCs carry oxygen throughout the body  Too many white blood cells (WBCs) and platelets may also be produced  The extra blood cells make your blood thicker than normal  Blood that is too thick cannot flow easily, so less oxygen is delivered to your body's tissues  Left untreated, PV is life-threatening  PV is usually caused by a gene mutation (change)  Your risk for PV increases if you are male or older than 50 years  Common signs and symptoms of PV:  Signs and symptoms develop slowly, over many years  You may have any of the following:  · Chest pain or discomfort    · Fatigue, weakness, or weight loss without trying    · Shortness of breath, or trouble breathing when you lie down    · Pressure on the left side of your abdomen, abdominal pain, or diarrhea    · Bulging veins, or blue or purple skin on your face or mucus membranes    · Itching that may be intense, or numbness or burning pain in your feet or hands    · High blood pressure, headaches, dizziness, or blurred vision    · Blood clots, bruising, and bleeding problems    · Pain and swelling in a joint, usually in a big toe, or bone pain  Call 911 for any of the following:   · You have any of the following signs of a heart attack:      ¨ Squeezing, pressure, or pain in your chest that lasts longer than 5 minutes or returns    ¨ Discomfort or pain in your back, neck, jaw, stomach, or arm     ¨ Trouble breathing    ¨ Nausea or vomiting    ¨ Lightheadedness or a sudden cold sweat, especially with chest pain or trouble breathing    · You have any of the following signs of a stroke:      ¨ Numbness or drooping on one side of your face     ¨ Weakness in an arm or leg    ¨ Confusion or difficulty speaking    ¨ Dizziness, a severe headache, or vision loss    · You have a severe headache or a seizure       · You cough up blood   Seek care immediately if:   · Your arm or leg feels warm, tender, and painful  It may look swollen and red  · You have new or worsening symptoms  · You have heavy bleeding that does not stop after 20 minutes of applied pressure  Contact your healthcare provider if:   · Your nose or gums bleed  · You see blood in your urine  · You have a sore or skin changes on your hands or feet, or under your compression stocking  · You have more bruises than usual      · You have questions or concerns about your condition or care  Treatment:  PV cannot be cured  It will always need to be managed  The goal of treatment is improve symptoms and reduce the risk for problems such as blood clots  · Phlebotomy  is a procedure used to remove blood from your body  About a pint of blood is removed during a session  The procedure lowers the number of RBCs and thins the blood  Phlebotomy may be done every few days to every few months  · Medicines  may be given to thin your blood  This will help reduce blood clots  Medicines may also be given to reduce itching or uric acid, or to control stomach acid  Medicine is sometimes used to make bone marrow create fewer RBCs  · Aspirin  can help thin your blood, and relive bone pain and burning in your hands or feet  Aspirin can cause stomach bleeding in some people  Only take aspirin if directed by your healthcare provider  Do not take more than the recommended amount  · Radiation  is a procedure used to stop bone marrow cells from producing too many RBCs  Prevent blood clots:   · Do not smoke  Nicotine and other chemicals in cigarettes and cigars can increase your risk for blood clots and cause lung damage  Ask your healthcare provider for information if you currently smoke and need help to quit  E-cigarettes or smokeless tobacco still contain nicotine  Talk to your healthcare provider before you use these products  · Help keep your blood flowing    Wear support stockings as directed  Support stockings are tight and help push blood up and out of your leg veins  Elevate your feet when you sit  This will help prevent blood from pooling in your leg veins  · Exercise as directed  Exercise such as walking helps improve blood flow and prevents blood clots  Ask your healthcare provider which exercises are best for you  Stop if you feel any chest pain or shortness of breath  · Drink liquids as directed  Liquids help keep your blood thin  Your healthcare provider may recommend that you drink at least 3 liters (12 cups) of liquid each day  Ask which liquids are best for you  Manage your symptoms:   · Prevent bleeding and bruising  PV or blood thinners used to manage it can increase your risk for heavy bleeding and easy bruising  Use an electric razor and soft toothbrush  Floss your teeth gently  Do not play contact sports, such as football  These sports increase the risk for bruising  Get care immediately if you are injured  Ask about other ways to prevent bleeding and bruising  Tell all healthcare providers that you have PV or are taking blood thinners  · Protect your hands and feet  Wear gloves outside if the temperature is low  Check your hands and feet for sores caused by blood circulation problems  You may not feel the sores because of the low blood flow  · Avoid high temperatures  Do not take hot baths or sit in hot tubs  The heat can increase symptoms such as facial flushing and skin itching  Protect your skin when you are outside in hot weather  Do not use a tanning bed or sun lamp  These can damage your skin  · Take cool baths to relive itching  Itching can increase after a hot bath  Cool water may help relive itching  You may want to use oatmeal, corn starch, or a mild moisturizing soap to relieve dryness or itching  Lotion may also help relieve dry skin  Follow up with your healthcare provider as directed:   You may need ongoing tests or treatment  Write down your questions so you remember to ask them during your visits  © 2017 [employer] Information is for End User's use only and may not be sold, redistributed or otherwise used for commercial purposes  All illustrations and images included in CareNotes® are the copyrighted property of [employer]  or [employer]iseno  The above information is an  only  It is not intended as medical advice for individual conditions or treatments  Talk to your doctor, nurse or pharmacist before following any medical regimen to see if it is safe and effective for you

## 2018-04-19 LAB
ENA SS-A AB SER-ACNC: <0.2 AI (ref 0–0.9)
ENA SS-B AB SER-ACNC: <0.2 AI (ref 0–0.9)
RHEUMATOID FACT SER QL LA: NEGATIVE

## 2018-04-20 DIAGNOSIS — R31.9 HEMATURIA, UNSPECIFIED TYPE: Primary | ICD-10-CM

## 2018-04-20 DIAGNOSIS — R80.9 PROTEINURIA, UNSPECIFIED TYPE: ICD-10-CM

## 2018-04-20 LAB
B BURGDOR IGG SER IA-ACNC: 0.38
B BURGDOR IGM SER IA-ACNC: 0.29

## 2018-04-24 LAB — MISCELLANEOUS LAB TEST RESULT: NORMAL

## 2018-04-30 ENCOUNTER — APPOINTMENT (OUTPATIENT)
Dept: LAB | Facility: CLINIC | Age: 58
End: 2018-04-30
Payer: COMMERCIAL

## 2018-04-30 ENCOUNTER — TRANSCRIBE ORDERS (OUTPATIENT)
Dept: LAB | Facility: CLINIC | Age: 58
End: 2018-04-30

## 2018-04-30 DIAGNOSIS — Z12.11 SCREENING FOR COLON CANCER: ICD-10-CM

## 2018-04-30 LAB — HEMOCCULT STL QL IA: POSITIVE

## 2018-04-30 PROCEDURE — 88112 CYTOPATH CELL ENHANCE TECH: CPT | Performed by: PATHOLOGY

## 2018-04-30 PROCEDURE — G0328 FECAL BLOOD SCRN IMMUNOASSAY: HCPCS

## 2018-05-02 ENCOUNTER — OFFICE VISIT (OUTPATIENT)
Dept: FAMILY MEDICINE CLINIC | Facility: CLINIC | Age: 58
End: 2018-05-02
Payer: COMMERCIAL

## 2018-05-02 VITALS
SYSTOLIC BLOOD PRESSURE: 120 MMHG | TEMPERATURE: 98.3 F | HEIGHT: 60 IN | WEIGHT: 215 LBS | HEART RATE: 72 BPM | BODY MASS INDEX: 42.21 KG/M2 | RESPIRATION RATE: 20 BRPM | DIASTOLIC BLOOD PRESSURE: 84 MMHG

## 2018-05-02 DIAGNOSIS — K04.7 TOOTH ABSCESS: ICD-10-CM

## 2018-05-02 DIAGNOSIS — I10 BENIGN ESSENTIAL HYPERTENSION: ICD-10-CM

## 2018-05-02 DIAGNOSIS — D75.1 POLYCYTHEMIA: ICD-10-CM

## 2018-05-02 DIAGNOSIS — R80.9 PROTEINURIA, UNSPECIFIED TYPE: Primary | ICD-10-CM

## 2018-05-02 DIAGNOSIS — K92.1 BLOOD IN STOOL: ICD-10-CM

## 2018-05-02 DIAGNOSIS — N02.9 IDIOPATHIC HEMATURIA WITH GLOMERULAR MORPHOLOGIC CHANGES: ICD-10-CM

## 2018-05-02 PROBLEM — R80.8 OTHER PROTEINURIA: Status: ACTIVE | Noted: 2018-05-02

## 2018-05-02 PROCEDURE — 99214 OFFICE O/P EST MOD 30 MIN: CPT | Performed by: NURSE PRACTITIONER

## 2018-05-02 PROCEDURE — 3079F DIAST BP 80-89 MM HG: CPT | Performed by: NURSE PRACTITIONER

## 2018-05-02 PROCEDURE — 3008F BODY MASS INDEX DOCD: CPT | Performed by: NURSE PRACTITIONER

## 2018-05-02 PROCEDURE — 3074F SYST BP LT 130 MM HG: CPT | Performed by: NURSE PRACTITIONER

## 2018-05-02 PROCEDURE — 4004F PT TOBACCO SCREEN RCVD TLK: CPT | Performed by: NURSE PRACTITIONER

## 2018-05-02 RX ORDER — ASPIRIN 81 MG/1
81 TABLET, CHEWABLE ORAL DAILY
Qty: 30 TABLET | Refills: 0 | Status: SHIPPED | OUTPATIENT
Start: 2018-05-02 | End: 2018-07-13 | Stop reason: SDUPTHER

## 2018-05-02 NOTE — PATIENT INSTRUCTIONS
24 Hour Urine Collection   AMBULATORY CARE:   What you need to know about a 24 hour urine collection:  A 24 hour urine collection is a test to save and measure all the urine you pass in a 24 hour period  The collection may be used to help your healthcare provider identify or treat a medical condition  How to prepare for a 24 hour urine collection:  Your healthcare provider will explain the test and why you need it  He will tell you when to start the urine test and any instructions you need  He will give you a container to collect and store your urine  Eat and drink what you usually would during the collection  How a 24 hour urine collection is done:  Keep your stored urine cool  Put the container in the refrigerator or in a pan with ice  Do not let your urine come in contact with toilet paper or your bowel movement  These will make the urine unusable  · On the morning you start the collection, urinate in the toilet, and flush the first urine you pass  Write down the date and time  That is the start time for the collection  · Collect all urine you pass throughout the day and night  Include the first urine you pass the following morning  That is the end of the collection  · Give the container with the collected urine to your healthcare provider  Make sure to keep it cool  Do not let it sit at room temperature for more than 2 hours  What you need to know about your test results: Your healthcare provider will discuss your test results with you  © 2017 [employer] Information is for End User's use only and may not be sold, redistributed or otherwise used for commercial purposes  All illustrations and images included in CareNotes® are the copyrighted property of [employer]  or Harvinder Briseno  The above information is an  only  It is not intended as medical advice for individual conditions or treatments   Talk to your doctor, nurse or pharmacist before following any medical regimen to see if it is safe and effective for you

## 2018-05-02 NOTE — PROGRESS NOTES
Assessment/Plan:    No problem-specific Assessment & Plan notes found for this encounter  Diagnoses and all orders for this visit:    Proteinuria, unspecified type  Comments:    Urine cytology negative for any cancer  Urine microscopic protein and hematuria -will repeat in 6 mos  Orders:  -     Microalbumin / creatinine urine ratio; Future  -     UA w Reflex to Microscopic w Reflex to Culture  -     Microalbumin / creatinine urine ratio; Future  -     Cytology, urine; Future    Polycythemia  Comments:  H&H elevated but stabalized in the setting of smoking 1ppd  pt not ready to quit, will repeat labwork in 6 mos  Orders:  -     CBC and differential; Future  -     aspirin 81 mg chewable tablet; Chew 1 tablet (81 mg total) daily    Tooth abscess  Comments:  F/u with oral surgeon    Idiopathic hematuria with glomerular morphologic changes  Comments:    Urine cytology negative for any cancer  Urine microscopic protein and hematuria -will repeat in 6 mos  Orders:  -     UA w Reflex to Microscopic w Reflex to Culture  -     Microalbumin / creatinine urine ratio; Future  -     Cytology, urine; Future    Benign essential hypertension  Comments:  Stop quinapril will reevaluate pts bp over the next 2 weeks on 2 agents Metoproplol and Amlodipine  Bp 1220/84 today  Orders:  -     Microalbumin / creatinine urine ratio; Future  -     Microalbumin / creatinine urine ratio; Future  -     Cytology, urine;  Future    Blood in stool  Comments:  positive hemocult  pt declines referral to GI or colonoscopy at this time, will repeat FOBT in 6 mos  Orders:  -     Occult Bloood,Fecal Immunochemical; Future          Subjective:      Patient ID: Carol Márquez is a 61 y o  female is here today to discuss lab results and urine cytology    Urine cytology negative for any cancer    Urine microscopic protein and hematuria -will repeat in 6 mos    Pt adminently declines any referral to GI or colonoscopy at this time , reports she has hemorrhoids however I did emphasize in the setting that her mother passed away from Stomach cancer I would advise the colonoscopy  HPI    The following portions of the patient's history were reviewed and updated as appropriate:   She  has a past medical history of ; Hyperlipidemia; Hypertension; Obesity; Post-menopausal; Seizures ([facility] [address] ); Thrombosis; and Vitamin D deficiency  She   Patient Active Problem List    Diagnosis Date Noted    Other proteinuria 2018    Idiopathic hematuria 2018    Polycythemia 2018    Fibromyalgia 2018    Tooth abscess 2017    Positive depression screening 2017    Vitamin D insufficiency 2016    Bilateral chronic knee pain 2016    Knee osteoarthritis 2016    Low back pain potentially associated with radiculopathy 2016    Myalgia 2016    Urinary incontinence 2016    Acute bronchitis 2016    Tracheobronchitis 2016    Arthritis of back 2016    Benign essential hypertension 2016    Bilateral knee pain 2016    Chronic lower back pain 2016    Chronic obstructive pulmonary disease ([facility] [address] ) 2016    Disc degeneration, lumbar 2016    Fibrocystic breast disease, unspecified laterality 2016    Generalized osteoarthritis of multiple sites 2016    Herniated lumbar intervertebral disc 2016    Hyperlipidemia 2016    Menopause 2016    Obstructive sleep apnea 2016     She  has a past surgical history that includes Hysterectomy and Incisional breast biopsy  Her family history includes Breast cancer in her family and sister; Heart attack in her father; Hypertension in her mother; Lung cancer in her family; Stomach cancer in her mother  She  reports that she has been smoking Cigarettes  She has a 13 00 pack-year smoking history  She has never used smokeless tobacco  She reports that she drinks alcohol   She reports that she does not use drugs  Current Outpatient Prescriptions   Medication Sig Dispense Refill    amLODIPine (NORVASC) 10 mg tablet Take 1 tablet (10 mg total) by mouth daily 30 tablet 5    atorvastatin (LIPITOR) 20 mg tablet Take 1 tablet (20 mg total) by mouth daily 30 tablet 5    ergocalciferol (VITAMIN D2) 50,000 units Take 1 capsule (50,000 Units total) by mouth once a week 12 capsule 3    furosemide (LASIX) 40 mg tablet Take 1 tablet (40 mg total) by mouth daily 30 tablet 5    metoprolol succinate (TOPROL-XL) 100 mg 24 hr tablet Take 1 tablet (100 mg total) by mouth daily 30 tablet 5    potassium chloride (K-DUR,KLOR-CON) 20 mEq tablet Take 1 tablet (20 mEq total) by mouth daily 30 tablet 5    aspirin 81 mg chewable tablet Chew 1 tablet (81 mg total) daily 30 tablet 0     No current facility-administered medications for this visit  Current Outpatient Prescriptions on File Prior to Visit   Medication Sig    amLODIPine (NORVASC) 10 mg tablet Take 1 tablet (10 mg total) by mouth daily    atorvastatin (LIPITOR) 20 mg tablet Take 1 tablet (20 mg total) by mouth daily    ergocalciferol (VITAMIN D2) 50,000 units Take 1 capsule (50,000 Units total) by mouth once a week    furosemide (LASIX) 40 mg tablet Take 1 tablet (40 mg total) by mouth daily    metoprolol succinate (TOPROL-XL) 100 mg 24 hr tablet Take 1 tablet (100 mg total) by mouth daily    potassium chloride (K-DUR,KLOR-CON) 20 mEq tablet Take 1 tablet (20 mEq total) by mouth daily    [DISCONTINUED] quinapril (ACCUPRIL) 40 MG tablet Take 1 tablet (40 mg total) by mouth daily     No current facility-administered medications on file prior to visit  She is allergic to benadryl [diphenhydramine] and tetracycline       Review of Systems   Constitutional: Negative  Negative for fatigue  HENT: Positive for dental problem  Negative for congestion, postnasal drip, rhinorrhea, sinus pain, sore throat and trouble swallowing  Eyes: Negative  Negative for pain and visual disturbance  Respiratory: Negative  Negative for cough, shortness of breath and wheezing  Cardiovascular: Negative  Negative for chest pain and palpitations  Gastrointestinal: Negative  Negative for constipation, diarrhea, nausea and vomiting  Endocrine: Negative  Negative for polydipsia, polyphagia and polyuria  Genitourinary: Negative  Negative for difficulty urinating, flank pain, frequency and pelvic pain  Musculoskeletal: Negative  Negative for arthralgias, back pain, joint swelling and myalgias  Skin: Negative  Negative for color change and rash  Allergic/Immunologic: Negative  Neurological: Negative  Negative for dizziness, syncope, weakness, light-headedness, numbness and headaches  Hematological: Negative  Negative for adenopathy  Does not bruise/bleed easily  Psychiatric/Behavioral: Negative  Negative for behavioral problems and sleep disturbance  The patient is not nervous/anxious  Objective:      /84 (BP Location: Left arm, Patient Position: Sitting, Cuff Size: Large)   Pulse 72   Temp 98 3 °F (36 8 °C) (Tympanic)   Resp 20   Ht 5' (1 524 m)   Wt 97 5 kg (215 lb)   BMI 41 99 kg/m²          Physical Exam   Constitutional: She is oriented to person, place, and time  She appears well-developed and well-nourished  HENT:   Head: Normocephalic  Mouth/Throat: She has dentures  Dental caries present  Eyes: Pupils are equal, round, and reactive to light  Neck: Normal range of motion  Cardiovascular: Normal rate and regular rhythm  Pulmonary/Chest: Effort normal and breath sounds normal    Abdominal: Soft  Bowel sounds are normal    Musculoskeletal: Normal range of motion  Neurological: She is alert and oriented to person, place, and time  Skin: Skin is warm and dry  Psychiatric: She has a normal mood and affect  Her behavior is normal  Judgment and thought content normal    Nursing note and vitals reviewed

## 2018-05-23 ENCOUNTER — TELEPHONE (OUTPATIENT)
Dept: FAMILY MEDICINE CLINIC | Facility: CLINIC | Age: 58
End: 2018-05-23

## 2018-07-13 DIAGNOSIS — D75.1 POLYCYTHEMIA: ICD-10-CM

## 2018-07-13 RX ORDER — ACETAMINOPHEN 160 MG
TABLET,CHEWABLE ORAL
Qty: 30 TABLET | Refills: 0 | Status: SHIPPED | OUTPATIENT
Start: 2018-07-13 | End: 2018-07-13 | Stop reason: SDUPTHER

## 2018-07-13 RX ORDER — ASPIRIN 81 MG/1
81 TABLET, CHEWABLE ORAL DAILY
Qty: 30 TABLET | Refills: 5 | Status: SHIPPED | OUTPATIENT
Start: 2018-07-13 | End: 2019-04-11 | Stop reason: SDUPTHER

## 2018-08-20 DIAGNOSIS — R60.9 EDEMA, UNSPECIFIED TYPE: ICD-10-CM

## 2018-08-20 DIAGNOSIS — E55.9 VITAMIN D DEFICIENCY: ICD-10-CM

## 2018-08-20 DIAGNOSIS — I10 HYPERTENSION, UNSPECIFIED TYPE: ICD-10-CM

## 2018-08-20 DIAGNOSIS — E78.5 HYPERLIPIDEMIA, UNSPECIFIED HYPERLIPIDEMIA TYPE: ICD-10-CM

## 2018-08-20 RX ORDER — METOPROLOL SUCCINATE 100 MG/1
TABLET, EXTENDED RELEASE ORAL
Qty: 30 TABLET | Refills: 5 | OUTPATIENT
Start: 2018-08-20

## 2018-08-20 RX ORDER — QUINAPRIL 40 MG/1
TABLET ORAL
Qty: 30 TABLET | Refills: 5 | OUTPATIENT
Start: 2018-08-20

## 2018-08-20 RX ORDER — AMLODIPINE BESYLATE 10 MG/1
TABLET ORAL
Qty: 30 TABLET | Refills: 5 | OUTPATIENT
Start: 2018-08-20

## 2018-08-20 RX ORDER — ATORVASTATIN CALCIUM 20 MG/1
TABLET, FILM COATED ORAL
Qty: 30 TABLET | Refills: 5 | OUTPATIENT
Start: 2018-08-20

## 2018-08-20 RX ORDER — FUROSEMIDE 40 MG/1
TABLET ORAL
Qty: 30 TABLET | Refills: 5 | OUTPATIENT
Start: 2018-08-20

## 2018-08-20 RX ORDER — POTASSIUM CHLORIDE 20 MEQ/1
TABLET, EXTENDED RELEASE ORAL
Qty: 30 TABLET | Refills: 5 | OUTPATIENT
Start: 2018-08-20

## 2018-08-20 NOTE — TELEPHONE ENCOUNTER
Please take a look at medication request sent need to find out what patient is taking because she did not come back to see me for bp check after changing her bp medicaton to Metoprolol and Norvasc    I also have order for her other medication that she was asked to d/c

## 2018-08-23 RX ORDER — METOPROLOL SUCCINATE 100 MG/1
100 TABLET, EXTENDED RELEASE ORAL DAILY
Qty: 30 TABLET | Refills: 5 | Status: SHIPPED | OUTPATIENT
Start: 2018-08-23 | End: 2019-04-11 | Stop reason: SDUPTHER

## 2018-08-23 RX ORDER — ERGOCALCIFEROL 1.25 MG/1
50000 CAPSULE ORAL WEEKLY
Qty: 12 CAPSULE | Refills: 1 | Status: SHIPPED | OUTPATIENT
Start: 2018-08-23 | End: 2018-09-28 | Stop reason: SDUPTHER

## 2018-08-23 RX ORDER — POTASSIUM CHLORIDE 20 MEQ/1
20 TABLET, EXTENDED RELEASE ORAL DAILY
Qty: 30 TABLET | Refills: 5 | Status: SHIPPED | OUTPATIENT
Start: 2018-08-23 | End: 2019-04-11 | Stop reason: SDUPTHER

## 2018-08-23 RX ORDER — AMLODIPINE BESYLATE 10 MG/1
10 TABLET ORAL DAILY
Qty: 30 TABLET | Refills: 5 | Status: SHIPPED | OUTPATIENT
Start: 2018-08-23 | End: 2019-04-11 | Stop reason: SDUPTHER

## 2018-08-23 RX ORDER — FUROSEMIDE 40 MG/1
40 TABLET ORAL DAILY
Qty: 30 TABLET | Refills: 5 | Status: SHIPPED | OUTPATIENT
Start: 2018-08-23 | End: 2019-04-11 | Stop reason: SDUPTHER

## 2018-08-23 RX ORDER — ATORVASTATIN CALCIUM 20 MG/1
20 TABLET, FILM COATED ORAL DAILY
Qty: 30 TABLET | Refills: 5 | Status: SHIPPED | OUTPATIENT
Start: 2018-08-23 | End: 2019-04-11 | Stop reason: SDUPTHER

## 2018-09-03 DIAGNOSIS — I10 HYPERTENSION, UNSPECIFIED TYPE: ICD-10-CM

## 2018-09-03 RX ORDER — QUINAPRIL 40 MG/1
TABLET ORAL
Qty: 30 TABLET | Refills: 5 | Status: SHIPPED | OUTPATIENT
Start: 2018-09-03 | End: 2018-12-05 | Stop reason: ALTCHOICE

## 2018-09-28 DIAGNOSIS — E55.9 VITAMIN D DEFICIENCY: ICD-10-CM

## 2018-09-28 RX ORDER — ERGOCALCIFEROL 1.25 MG/1
50000 CAPSULE ORAL WEEKLY
Qty: 4 CAPSULE | Refills: 3 | Status: SHIPPED | OUTPATIENT
Start: 2018-09-28 | End: 2019-04-11 | Stop reason: SDUPTHER

## 2018-09-28 NOTE — TELEPHONE ENCOUNTER
Needs refill on ergocalciferol 50,000 units takes 1 weekly  #4 refill 1    Pharmacy [facility] [address] [facility]  Phone:  [phone]

## 2018-12-04 ENCOUNTER — APPOINTMENT (OUTPATIENT)
Dept: LAB | Facility: CLINIC | Age: 58
End: 2018-12-04
Payer: COMMERCIAL

## 2018-12-04 DIAGNOSIS — I10 BENIGN ESSENTIAL HYPERTENSION: ICD-10-CM

## 2018-12-04 DIAGNOSIS — N02.9 IDIOPATHIC HEMATURIA WITH GLOMERULAR MORPHOLOGIC CHANGES: ICD-10-CM

## 2018-12-04 DIAGNOSIS — I10 ESSENTIAL (PRIMARY) HYPERTENSION: ICD-10-CM

## 2018-12-04 DIAGNOSIS — D75.1 POLYCYTHEMIA: ICD-10-CM

## 2018-12-04 DIAGNOSIS — R80.9 PROTEINURIA, UNSPECIFIED TYPE: ICD-10-CM

## 2018-12-04 LAB
BACTERIA UR QL AUTO: NORMAL /HPF
BASOPHILS # BLD AUTO: 0.13 THOUSANDS/ΜL (ref 0–0.1)
BASOPHILS NFR BLD AUTO: 1 % (ref 0–1)
BILIRUB UR QL STRIP: NEGATIVE
CLARITY UR: CLEAR
COLOR UR: YELLOW
CREAT UR-MCNC: 72.6 MG/DL
EOSINOPHIL # BLD AUTO: 0.39 THOUSAND/ΜL (ref 0–0.61)
EOSINOPHIL NFR BLD AUTO: 3 % (ref 0–6)
ERYTHROCYTE [DISTWIDTH] IN BLOOD BY AUTOMATED COUNT: 13.1 % (ref 11.6–15.1)
GLUCOSE UR STRIP-MCNC: NEGATIVE MG/DL
HCT VFR BLD AUTO: 51.1 % (ref 34.8–46.1)
HGB BLD-MCNC: 16.5 G/DL (ref 11.5–15.4)
HGB UR QL STRIP.AUTO: ABNORMAL
HYALINE CASTS #/AREA URNS LPF: NORMAL /LPF
IMM GRANULOCYTES # BLD AUTO: 0.03 THOUSAND/UL (ref 0–0.2)
IMM GRANULOCYTES NFR BLD AUTO: 0 % (ref 0–2)
KETONES UR STRIP-MCNC: NEGATIVE MG/DL
LEUKOCYTE ESTERASE UR QL STRIP: NEGATIVE
LYMPHOCYTES # BLD AUTO: 4.42 THOUSANDS/ΜL (ref 0.6–4.47)
LYMPHOCYTES NFR BLD AUTO: 37 % (ref 14–44)
MCH RBC QN AUTO: 29.8 PG (ref 26.8–34.3)
MCHC RBC AUTO-ENTMCNC: 32.3 G/DL (ref 31.4–37.4)
MCV RBC AUTO: 92 FL (ref 82–98)
MICROALBUMIN UR-MCNC: 30.1 MG/L (ref 0–20)
MICROALBUMIN/CREAT 24H UR: 41 MG/G CREATININE (ref 0–30)
MONOCYTES # BLD AUTO: 0.73 THOUSAND/ΜL (ref 0.17–1.22)
MONOCYTES NFR BLD AUTO: 6 % (ref 4–12)
NEUTROPHILS # BLD AUTO: 6.35 THOUSANDS/ΜL (ref 1.85–7.62)
NEUTS SEG NFR BLD AUTO: 53 % (ref 43–75)
NITRITE UR QL STRIP: NEGATIVE
NON-SQ EPI CELLS URNS QL MICRO: NORMAL /HPF
NRBC BLD AUTO-RTO: 0 /100 WBCS
PH UR STRIP.AUTO: 6 [PH] (ref 4.5–8)
PLATELET # BLD AUTO: 383 THOUSANDS/UL (ref 149–390)
PMV BLD AUTO: 9.8 FL (ref 8.9–12.7)
PROT UR STRIP-MCNC: NEGATIVE MG/DL
RBC # BLD AUTO: 5.53 MILLION/UL (ref 3.81–5.12)
RBC #/AREA URNS AUTO: NORMAL /HPF
SP GR UR STRIP.AUTO: 1.01 (ref 1–1.03)
UROBILINOGEN UR QL STRIP.AUTO: 0.2 E.U./DL
WBC # BLD AUTO: 12.05 THOUSAND/UL (ref 4.31–10.16)
WBC #/AREA URNS AUTO: NORMAL /HPF

## 2018-12-04 PROCEDURE — 85025 COMPLETE CBC W/AUTO DIFF WBC: CPT

## 2018-12-04 PROCEDURE — 82043 UR ALBUMIN QUANTITATIVE: CPT

## 2018-12-04 PROCEDURE — 88112 CYTOPATH CELL ENHANCE TECH: CPT | Performed by: PATHOLOGY

## 2018-12-04 PROCEDURE — 82570 ASSAY OF URINE CREATININE: CPT

## 2018-12-04 PROCEDURE — 36415 COLL VENOUS BLD VENIPUNCTURE: CPT

## 2018-12-04 PROCEDURE — 81001 URINALYSIS AUTO W/SCOPE: CPT | Performed by: NURSE PRACTITIONER

## 2018-12-05 ENCOUNTER — OFFICE VISIT (OUTPATIENT)
Dept: FAMILY MEDICINE CLINIC | Facility: CLINIC | Age: 58
End: 2018-12-05
Payer: COMMERCIAL

## 2018-12-05 ENCOUNTER — LAB (OUTPATIENT)
Dept: LAB | Facility: CLINIC | Age: 58
End: 2018-12-05
Payer: COMMERCIAL

## 2018-12-05 VITALS
DIASTOLIC BLOOD PRESSURE: 82 MMHG | HEIGHT: 60 IN | RESPIRATION RATE: 12 BRPM | TEMPERATURE: 98.5 F | BODY MASS INDEX: 40.88 KG/M2 | HEART RATE: 68 BPM | WEIGHT: 208.2 LBS | SYSTOLIC BLOOD PRESSURE: 120 MMHG

## 2018-12-05 DIAGNOSIS — Z13.1 SCREENING FOR DIABETES MELLITUS: ICD-10-CM

## 2018-12-05 DIAGNOSIS — R31.1 BENIGN ESSENTIAL MICROSCOPIC HEMATURIA: ICD-10-CM

## 2018-12-05 DIAGNOSIS — R80.8 OTHER PROTEINURIA: ICD-10-CM

## 2018-12-05 DIAGNOSIS — D75.1 POLYCYTHEMIA: ICD-10-CM

## 2018-12-05 DIAGNOSIS — Z12.39 SCREENING FOR BREAST CANCER: ICD-10-CM

## 2018-12-05 DIAGNOSIS — M15.9 GENERALIZED OSTEOARTHRITIS OF MULTIPLE SITES: ICD-10-CM

## 2018-12-05 DIAGNOSIS — K92.1 BLOOD IN STOOL: ICD-10-CM

## 2018-12-05 DIAGNOSIS — I10 BENIGN ESSENTIAL HYPERTENSION: Primary | ICD-10-CM

## 2018-12-05 LAB
HEMOCCULT STL QL IA: NEGATIVE
SL AMB POCT HEMOGLOBIN AIC: 5.3

## 2018-12-05 PROCEDURE — 83036 HEMOGLOBIN GLYCOSYLATED A1C: CPT | Performed by: NURSE PRACTITIONER

## 2018-12-05 PROCEDURE — 3008F BODY MASS INDEX DOCD: CPT | Performed by: NURSE PRACTITIONER

## 2018-12-05 PROCEDURE — 3079F DIAST BP 80-89 MM HG: CPT | Performed by: NURSE PRACTITIONER

## 2018-12-05 PROCEDURE — 36416 COLLJ CAPILLARY BLOOD SPEC: CPT | Performed by: NURSE PRACTITIONER

## 2018-12-05 PROCEDURE — 3074F SYST BP LT 130 MM HG: CPT | Performed by: NURSE PRACTITIONER

## 2018-12-05 PROCEDURE — G0328 FECAL BLOOD SCRN IMMUNOASSAY: HCPCS

## 2018-12-05 PROCEDURE — 99214 OFFICE O/P EST MOD 30 MIN: CPT | Performed by: NURSE PRACTITIONER

## 2018-12-05 RX ORDER — LOSARTAN POTASSIUM 100 MG/1
100 TABLET ORAL DAILY
Qty: 30 TABLET | Refills: 1 | Status: SHIPPED | OUTPATIENT
Start: 2018-12-05 | End: 2019-04-11 | Stop reason: SDUPTHER

## 2018-12-05 NOTE — PROGRESS NOTES
Assessment/Plan:    No problem-specific Assessment & Plan notes found for this encounter  Diagnoses and all orders for this visit:    Benign essential hypertension  Comments:  Bp 120/82 on 3 agents will stop the Quinapril and start Losartan due to hematuria and proteinuria, pt will need to check bps at home RTC 1 mos  Orders:  -     US kidney and bladder; Future  -     losartan (COZAAR) 100 MG tablet; Take 1 tablet (100 mg total) by mouth daily    Generalized osteoarthritis of multiple sites  Comments:  Pt does not take any medication at this time    Polycythemia  Comments:  H& H elevated but stable at this time    Other proteinuria  Comments:  US bladder and kidneys ordered  Orders:  -     US kidney and bladder; Future    Screening for breast cancer  Comments:  mammogram ordered  Orders:  -     Mammo screening bilateral w 3d & cad; Future    Benign essential microscopic hematuria  Comments:  US bladder and kidneys  Orders:  -     US kidney and bladder; Future  -     POCT hemoglobin A1c    Screening for diabetes mellitus  Comments:  POCT HgbA1C 5 3          Subjective:      Patient ID: Neva Mai is a 61 y o  female  61 yr old female here for 6 mos f/u review of labwork  Pt continues with hematuria and proteinuria which I suspect may be a result of long term use of Accupril  We discussed substituting Losartan and need for pt to follow with Nephrology but she does not wish to see any other specialists at this time due to transportation issues  Pt also did not have her mammogram  Pt is agreeable to US bladder and kidneys  I did  her on quitting smoking but she is not interested at this time  Pt states "I feel fine" but I did advise her that in the setting of long term 1ppd 40 plus years of smoking, hypertension and abnormal labwork I have concerns for renal disease vs cancer  Pt will return to care in 1 mos          The following portions of the patient's history were reviewed and updated as appropriate:   She  has a past medical history of ; Hyperlipidemia; Hypertension; Obesity; Post-menopausal; Seizures ([facility] [address] ); Thrombosis; and Vitamin D deficiency  She   Patient Active Problem List    Diagnosis Date Noted    Other proteinuria 2018    Idiopathic hematuria 2018    Polycythemia 2018    Fibromyalgia 2018    Tooth abscess 2017    Positive depression screening 2017    Vitamin D insufficiency 2016    Bilateral chronic knee pain 2016    Knee osteoarthritis 2016    Low back pain potentially associated with radiculopathy 2016    Myalgia 2016    Urinary incontinence 2016    Acute bronchitis 2016    Tracheobronchitis 2016    Arthritis of back 2016    Benign essential hypertension 2016    Bilateral knee pain 2016    Chronic lower back pain 2016    [facility]    Disc degeneration, lumbar 2016    Fibrocystic breast disease, unspecified laterality 2016    Generalized osteoarthritis of multiple sites 2016    Herniated lumbar intervertebral disc 2016    Hyperlipidemia 2016    Menopause 2016    Obstructive sleep apnea 2016     She  has a past surgical history that includes Hysterectomy and Incisional breast biopsy  Her family history includes Breast cancer in her family and sister; Heart attack in her father; Hypertension in her mother; Lung cancer in her family; Stomach cancer in her mother  She  reports that she has been smoking Cigarettes  She has a 13 00 pack-year smoking history  She has never used smokeless tobacco  She reports that she drinks alcohol  She reports that she does not use drugs    Current Outpatient Prescriptions   Medication Sig Dispense Refill    amLODIPine (NORVASC) 10 mg tablet Take 1 tablet (10 mg total) by mouth daily 30 tablet 5    aspirin (RA ASPIRIN ADULT LOW STRENGTH) 81 mg chewable tablet Chew 1 tablet (81 mg total) daily Chew and swallow 30 tablet 5    atorvastatin (LIPITOR) 20 mg tablet Take 1 tablet (20 mg total) by mouth daily 30 tablet 5    ergocalciferol (VITAMIN D2) 50,000 units Take 1 capsule (50,000 Units total) by mouth once a week 4 capsule 3    furosemide (LASIX) 40 mg tablet Take 1 tablet (40 mg total) by mouth daily 30 tablet 5    metoprolol succinate (TOPROL-XL) 100 mg 24 hr tablet Take 1 tablet (100 mg total) by mouth daily 30 tablet 5    potassium chloride (K-DUR,KLOR-CON) 20 mEq tablet Take 1 tablet (20 mEq total) by mouth daily 30 tablet 5    losartan (COZAAR) 100 MG tablet Take 1 tablet (100 mg total) by mouth daily 30 tablet 1     No current facility-administered medications for this visit  Current Outpatient Prescriptions on File Prior to Visit   Medication Sig    amLODIPine (NORVASC) 10 mg tablet Take 1 tablet (10 mg total) by mouth daily    aspirin (RA ASPIRIN ADULT LOW STRENGTH) 81 mg chewable tablet Chew 1 tablet (81 mg total) daily Chew and swallow    atorvastatin (LIPITOR) 20 mg tablet Take 1 tablet (20 mg total) by mouth daily    ergocalciferol (VITAMIN D2) 50,000 units Take 1 capsule (50,000 Units total) by mouth once a week    furosemide (LASIX) 40 mg tablet Take 1 tablet (40 mg total) by mouth daily    metoprolol succinate (TOPROL-XL) 100 mg 24 hr tablet Take 1 tablet (100 mg total) by mouth daily    potassium chloride (K-DUR,KLOR-CON) 20 mEq tablet Take 1 tablet (20 mEq total) by mouth daily    [DISCONTINUED] quinapril (ACCUPRIL) 40 MG tablet take 1 tablet by mouth once daily     No current facility-administered medications on file prior to visit  She is allergic to benadryl [diphenhydramine] and tetracycline       Review of Systems   Constitutional: Negative  Negative for fatigue  HENT: Negative  Negative for congestion, postnasal drip, rhinorrhea, sinus pain, sore throat and trouble swallowing      Eyes: Negative  Negative for pain and visual disturbance  Respiratory: Negative  Negative for cough, shortness of breath and wheezing  Cardiovascular: Negative  Negative for chest pain and palpitations  Gastrointestinal: Negative  Negative for constipation, diarrhea, nausea and vomiting  Endocrine: Negative  Negative for polydipsia, polyphagia and polyuria  Genitourinary: Negative  Negative for difficulty urinating, flank pain, frequency and pelvic pain  Musculoskeletal: Positive for arthralgias, back pain and neck pain  Negative for joint swelling and myalgias  Skin: Negative  Negative for color change and rash  Allergic/Immunologic: Negative  Neurological: Negative  Negative for dizziness, syncope, weakness, light-headedness, numbness and headaches  Hematological: Negative  Negative for adenopathy  Does not bruise/bleed easily  Psychiatric/Behavioral: Negative  Negative for behavioral problems and sleep disturbance  The patient is not nervous/anxious  Objective:      /82 (BP Location: Left arm, Patient Position: Sitting, Cuff Size: Large)   Pulse 68   Temp 98 5 °F (36 9 °C) (Tympanic)   Resp 12   Ht 5' (1 524 m)   Wt 94 4 kg (208 lb 3 2 oz)   BMI 40 66 kg/m²          Physical Exam   Constitutional: She is oriented to person, place, and time  She appears well-developed and well-nourished  HENT:   Head: Normocephalic  Eyes: Pupils are equal, round, and reactive to light  Neck: Normal range of motion  Cardiovascular: Normal rate and regular rhythm  Pulmonary/Chest: Effort normal  She has wheezes in the right middle field and the left middle field  Abdominal: Soft  Bowel sounds are normal    Musculoskeletal:        Cervical back: She exhibits decreased range of motion and pain  Lumbar back: She exhibits decreased range of motion and pain  Neurological: She is alert and oriented to person, place, and time  Skin: Skin is warm and dry  Psychiatric: She has a normal mood and affect  Her behavior is normal  Judgment and thought content normal    Nursing note and vitals reviewed

## 2018-12-17 ENCOUNTER — HOSPITAL ENCOUNTER (OUTPATIENT)
Dept: ULTRASOUND IMAGING | Facility: HOSPITAL | Age: 58
Discharge: HOME | End: 2018-12-17
Payer: COMMERCIAL

## 2018-12-17 ENCOUNTER — HOSPITAL ENCOUNTER (OUTPATIENT)
Dept: MAMMOGRAPHY | Facility: HOSPITAL | Age: 58
Discharge: HOME | End: 2018-12-17
Payer: COMMERCIAL

## 2018-12-17 DIAGNOSIS — R80.8 OTHER PROTEINURIA: ICD-10-CM

## 2018-12-17 DIAGNOSIS — Z12.39 SCREENING FOR BREAST CANCER: ICD-10-CM

## 2018-12-17 DIAGNOSIS — R31.1 BENIGN ESSENTIAL MICROSCOPIC HEMATURIA: ICD-10-CM

## 2018-12-17 DIAGNOSIS — I10 BENIGN ESSENTIAL HYPERTENSION: ICD-10-CM

## 2018-12-17 PROCEDURE — 77063 BREAST TOMOSYNTHESIS BI: CPT

## 2018-12-17 PROCEDURE — 77067 SCR MAMMO BI INCL CAD: CPT

## 2018-12-17 PROCEDURE — 76770 US EXAM ABDO BACK WALL COMP: CPT

## 2019-01-02 ENCOUNTER — OFFICE VISIT (OUTPATIENT)
Dept: FAMILY MEDICINE CLINIC | Facility: CLINIC | Age: 59
End: 2019-01-02
Payer: COMMERCIAL

## 2019-01-02 DIAGNOSIS — Z53.8 APPOINTMENT CANCELED BY HOSPITAL: Primary | ICD-10-CM

## 2019-01-02 PROCEDURE — 99211 OFF/OP EST MAY X REQ PHY/QHP: CPT | Performed by: NURSE PRACTITIONER

## 2019-02-06 ENCOUNTER — TELEPHONE (OUTPATIENT)
Dept: FAMILY MEDICINE CLINIC | Facility: CLINIC | Age: 59
End: 2019-02-06

## 2019-02-06 NOTE — TELEPHONE ENCOUNTER
Need office note for pt to get AFO Brace, Was last seen 7/20/2017? They ask if you could sign off on the P T  Last note? She has [employer] [phone]

## 2019-04-11 DIAGNOSIS — D75.1 POLYCYTHEMIA: ICD-10-CM

## 2019-04-11 DIAGNOSIS — E55.9 VITAMIN D DEFICIENCY: ICD-10-CM

## 2019-04-11 DIAGNOSIS — I10 BENIGN ESSENTIAL HYPERTENSION: ICD-10-CM

## 2019-04-11 DIAGNOSIS — I10 HYPERTENSION, UNSPECIFIED TYPE: ICD-10-CM

## 2019-04-11 DIAGNOSIS — E78.5 HYPERLIPIDEMIA, UNSPECIFIED HYPERLIPIDEMIA TYPE: ICD-10-CM

## 2019-04-11 DIAGNOSIS — R60.9 EDEMA, UNSPECIFIED TYPE: ICD-10-CM

## 2019-04-11 RX ORDER — POTASSIUM CHLORIDE 20 MEQ/1
20 TABLET, EXTENDED RELEASE ORAL DAILY
Qty: 30 TABLET | Refills: 5 | Status: SHIPPED | OUTPATIENT
Start: 2019-04-11 | End: 2019-10-28 | Stop reason: SDUPTHER

## 2019-04-11 RX ORDER — ATORVASTATIN CALCIUM 20 MG/1
20 TABLET, FILM COATED ORAL DAILY
Qty: 30 TABLET | Refills: 5 | Status: SHIPPED | OUTPATIENT
Start: 2019-04-11 | End: 2019-10-28 | Stop reason: SDUPTHER

## 2019-04-11 RX ORDER — ASPIRIN 81 MG/1
81 TABLET, CHEWABLE ORAL DAILY
Qty: 30 TABLET | Refills: 5 | Status: SHIPPED | OUTPATIENT
Start: 2019-04-11

## 2019-04-11 RX ORDER — METOPROLOL SUCCINATE 100 MG/1
100 TABLET, EXTENDED RELEASE ORAL DAILY
Qty: 30 TABLET | Refills: 5 | Status: SHIPPED | OUTPATIENT
Start: 2019-04-11 | End: 2019-10-28 | Stop reason: SDUPTHER

## 2019-04-11 RX ORDER — FUROSEMIDE 40 MG/1
40 TABLET ORAL DAILY
Qty: 30 TABLET | Refills: 5 | Status: SHIPPED | OUTPATIENT
Start: 2019-04-11 | End: 2019-10-28 | Stop reason: SDUPTHER

## 2019-04-11 RX ORDER — LOSARTAN POTASSIUM 100 MG/1
100 TABLET ORAL DAILY
Qty: 30 TABLET | Refills: 5 | Status: SHIPPED | OUTPATIENT
Start: 2019-04-11 | End: 2019-05-20

## 2019-04-11 RX ORDER — AMLODIPINE BESYLATE 10 MG/1
10 TABLET ORAL DAILY
Qty: 30 TABLET | Refills: 5 | Status: SHIPPED | OUTPATIENT
Start: 2019-04-11 | End: 2019-10-28 | Stop reason: SDUPTHER

## 2019-04-11 RX ORDER — ERGOCALCIFEROL 1.25 MG/1
50000 CAPSULE ORAL WEEKLY
Qty: 4 CAPSULE | Refills: 3 | Status: SHIPPED | OUTPATIENT
Start: 2019-04-11

## 2019-05-20 ENCOUNTER — OFFICE VISIT (OUTPATIENT)
Dept: INTERNAL MEDICINE CLINIC | Facility: CLINIC | Age: 59
End: 2019-05-20
Payer: COMMERCIAL

## 2019-05-20 VITALS
SYSTOLIC BLOOD PRESSURE: 118 MMHG | WEIGHT: 223 LBS | RESPIRATION RATE: 18 BRPM | HEIGHT: 60 IN | TEMPERATURE: 99.5 F | OXYGEN SATURATION: 98 % | HEART RATE: 62 BPM | DIASTOLIC BLOOD PRESSURE: 84 MMHG | BODY MASS INDEX: 43.78 KG/M2

## 2019-05-20 DIAGNOSIS — M79.7 FIBROMYALGIA: ICD-10-CM

## 2019-05-20 DIAGNOSIS — E66.01 CLASS 3 SEVERE OBESITY DUE TO EXCESS CALORIES WITH BODY MASS INDEX (BMI) OF 40.0 TO 44.9 IN ADULT, UNSPECIFIED WHETHER SERIOUS COMORBIDITY PRESENT (HCC): ICD-10-CM

## 2019-05-20 DIAGNOSIS — R80.8 OTHER PROTEINURIA: ICD-10-CM

## 2019-05-20 DIAGNOSIS — I10 BENIGN ESSENTIAL HYPERTENSION: Primary | ICD-10-CM

## 2019-05-20 DIAGNOSIS — G89.29 CHRONIC BILATERAL LOW BACK PAIN WITHOUT SCIATICA: ICD-10-CM

## 2019-05-20 DIAGNOSIS — M51.36 DISC DEGENERATION, LUMBAR: ICD-10-CM

## 2019-05-20 DIAGNOSIS — F17.200 CURRENT SMOKER ON SOME DAYS: ICD-10-CM

## 2019-05-20 DIAGNOSIS — M54.50 CHRONIC BILATERAL LOW BACK PAIN WITHOUT SCIATICA: ICD-10-CM

## 2019-05-20 DIAGNOSIS — J44.9 CHRONIC OBSTRUCTIVE PULMONARY DISEASE, UNSPECIFIED COPD TYPE (HCC): ICD-10-CM

## 2019-05-20 PROBLEM — E66.9 OBESITY: Status: ACTIVE | Noted: 2019-05-20

## 2019-05-20 PROCEDURE — 3079F DIAST BP 80-89 MM HG: CPT | Performed by: NURSE PRACTITIONER

## 2019-05-20 PROCEDURE — 96372 THER/PROPH/DIAG INJ SC/IM: CPT | Performed by: NURSE PRACTITIONER

## 2019-05-20 PROCEDURE — 99214 OFFICE O/P EST MOD 30 MIN: CPT | Performed by: NURSE PRACTITIONER

## 2019-05-20 PROCEDURE — 3008F BODY MASS INDEX DOCD: CPT | Performed by: NURSE PRACTITIONER

## 2019-05-20 PROCEDURE — 3074F SYST BP LT 130 MM HG: CPT | Performed by: NURSE PRACTITIONER

## 2019-05-20 PROCEDURE — 4004F PT TOBACCO SCREEN RCVD TLK: CPT | Performed by: NURSE PRACTITIONER

## 2019-05-20 RX ORDER — QUINAPRIL 40 MG/1
40 TABLET ORAL
COMMUNITY
End: 2019-05-20 | Stop reason: SDUPTHER

## 2019-05-20 RX ORDER — KETOROLAC TROMETHAMINE 30 MG/ML
30 INJECTION, SOLUTION INTRAMUSCULAR; INTRAVENOUS ONCE
Status: COMPLETED | OUTPATIENT
Start: 2019-05-20 | End: 2019-05-20

## 2019-05-20 RX ORDER — CYCLOBENZAPRINE HCL 10 MG
10 TABLET ORAL 3 TIMES DAILY PRN
Qty: 64 TABLET | Refills: 3 | Status: SHIPPED | OUTPATIENT
Start: 2019-05-20 | End: 2019-06-10

## 2019-05-20 RX ORDER — QUINAPRIL 40 MG/1
40 TABLET ORAL
Qty: 90 TABLET | Refills: 3 | Status: SHIPPED | OUTPATIENT
Start: 2019-05-20 | End: 2020-01-27

## 2019-05-20 RX ADMIN — KETOROLAC TROMETHAMINE 30 MG: 30 INJECTION, SOLUTION INTRAMUSCULAR; INTRAVENOUS at 14:01

## 2019-10-28 DIAGNOSIS — R60.9 EDEMA, UNSPECIFIED TYPE: ICD-10-CM

## 2019-10-28 DIAGNOSIS — E78.5 HYPERLIPIDEMIA, UNSPECIFIED HYPERLIPIDEMIA TYPE: ICD-10-CM

## 2019-10-28 DIAGNOSIS — I10 HYPERTENSION, UNSPECIFIED TYPE: ICD-10-CM

## 2019-10-28 RX ORDER — FUROSEMIDE 40 MG/1
TABLET ORAL
Qty: 30 TABLET | Refills: 0 | Status: SHIPPED | OUTPATIENT
Start: 2019-10-28

## 2019-10-28 RX ORDER — AMLODIPINE BESYLATE 10 MG/1
TABLET ORAL
Qty: 30 TABLET | Refills: 0 | Status: SHIPPED | OUTPATIENT
Start: 2019-10-28

## 2019-10-28 RX ORDER — METOPROLOL SUCCINATE 100 MG/1
TABLET, EXTENDED RELEASE ORAL
Qty: 30 TABLET | Refills: 0 | Status: SHIPPED | OUTPATIENT
Start: 2019-10-28

## 2019-10-28 RX ORDER — ATORVASTATIN CALCIUM 20 MG/1
TABLET, FILM COATED ORAL
Qty: 30 TABLET | Refills: 0 | Status: SHIPPED | OUTPATIENT
Start: 2019-10-28

## 2019-10-28 RX ORDER — POTASSIUM CHLORIDE 20 MEQ/1
TABLET, EXTENDED RELEASE ORAL
Qty: 30 TABLET | Refills: 0 | Status: SHIPPED | OUTPATIENT
Start: 2019-10-28

## 2019-11-30 DIAGNOSIS — I10 HYPERTENSION, UNSPECIFIED TYPE: ICD-10-CM

## 2019-11-30 DIAGNOSIS — E78.5 HYPERLIPIDEMIA, UNSPECIFIED HYPERLIPIDEMIA TYPE: ICD-10-CM

## 2019-11-30 DIAGNOSIS — R60.9 EDEMA, UNSPECIFIED TYPE: ICD-10-CM

## 2019-12-03 RX ORDER — FUROSEMIDE 40 MG/1
TABLET ORAL
Qty: 90 TABLET | Refills: 3 | Status: SHIPPED | OUTPATIENT
Start: 2019-12-03

## 2019-12-03 RX ORDER — ATORVASTATIN CALCIUM 20 MG/1
TABLET, FILM COATED ORAL
Qty: 30 TABLET | Refills: 0 | Status: SHIPPED | OUTPATIENT
Start: 2019-12-03

## 2019-12-03 RX ORDER — METOPROLOL SUCCINATE 100 MG/1
TABLET, EXTENDED RELEASE ORAL
Qty: 30 TABLET | Refills: 0 | Status: SHIPPED | OUTPATIENT
Start: 2019-12-03

## 2019-12-03 RX ORDER — POTASSIUM CHLORIDE 20 MEQ/1
TABLET, EXTENDED RELEASE ORAL
Qty: 30 TABLET | Refills: 0 | Status: SHIPPED | OUTPATIENT
Start: 2019-12-03

## 2019-12-03 RX ORDER — AMLODIPINE BESYLATE 10 MG/1
TABLET ORAL
Qty: 30 TABLET | Refills: 0 | Status: SHIPPED | OUTPATIENT
Start: 2019-12-03

## 2020-01-26 DIAGNOSIS — I10 BENIGN ESSENTIAL HYPERTENSION: ICD-10-CM

## 2020-01-26 DIAGNOSIS — E78.5 HYPERLIPIDEMIA, UNSPECIFIED HYPERLIPIDEMIA TYPE: ICD-10-CM

## 2020-01-26 DIAGNOSIS — E87.6 HYPOKALEMIA: Primary | ICD-10-CM

## 2020-01-27 RX ORDER — ATORVASTATIN CALCIUM 20 MG/1
TABLET, FILM COATED ORAL
Qty: 30 TABLET | Refills: 0 | Status: SHIPPED | OUTPATIENT
Start: 2020-01-27

## 2020-01-27 RX ORDER — QUINAPRIL 40 MG/1
TABLET ORAL
Qty: 30 TABLET | Refills: 0 | Status: SHIPPED | OUTPATIENT
Start: 2020-01-27

## 2020-01-27 RX ORDER — METOPROLOL SUCCINATE 100 MG/1
TABLET, EXTENDED RELEASE ORAL
Qty: 30 TABLET | Refills: 0 | Status: SHIPPED | OUTPATIENT
Start: 2020-01-27

## 2020-01-27 RX ORDER — AMLODIPINE BESYLATE 10 MG/1
TABLET ORAL
Qty: 30 TABLET | Refills: 0 | Status: SHIPPED | OUTPATIENT
Start: 2020-01-27

## 2020-01-27 RX ORDER — POTASSIUM CHLORIDE 20 MEQ/1
TABLET, EXTENDED RELEASE ORAL
Qty: 30 TABLET | Refills: 0 | Status: SHIPPED | OUTPATIENT
Start: 2020-01-27

## 2020-03-25 DIAGNOSIS — E78.5 HYPERLIPIDEMIA, UNSPECIFIED HYPERLIPIDEMIA TYPE: Primary | ICD-10-CM

## 2020-03-25 DIAGNOSIS — I10 ESSENTIAL HYPERTENSION: ICD-10-CM

## 2020-03-25 RX ORDER — POTASSIUM CHLORIDE 20 MEQ/1
TABLET, EXTENDED RELEASE ORAL
Qty: 30 TABLET | Refills: 1 | Status: SHIPPED | OUTPATIENT
Start: 2020-03-25

## 2020-03-25 RX ORDER — AMLODIPINE BESYLATE 10 MG/1
TABLET ORAL
Qty: 30 TABLET | Refills: 1 | Status: SHIPPED | OUTPATIENT
Start: 2020-03-25

## 2020-03-25 RX ORDER — METOPROLOL SUCCINATE 100 MG/1
TABLET, EXTENDED RELEASE ORAL
Qty: 30 TABLET | Refills: 1 | Status: SHIPPED | OUTPATIENT
Start: 2020-03-25

## 2020-03-25 RX ORDER — ATORVASTATIN CALCIUM 20 MG/1
TABLET, FILM COATED ORAL
Qty: 30 TABLET | Refills: 1 | Status: SHIPPED | OUTPATIENT
Start: 2020-03-25

## 2020-03-26 DIAGNOSIS — E78.2 MIXED HYPERLIPIDEMIA: Primary | ICD-10-CM

## 2020-03-26 DIAGNOSIS — I10 BENIGN ESSENTIAL HYPERTENSION: ICD-10-CM

## 2020-03-26 RX ORDER — POTASSIUM CHLORIDE 20 MEQ/1
TABLET, EXTENDED RELEASE ORAL
Qty: 30 TABLET | Refills: 5 | Status: SHIPPED | OUTPATIENT
Start: 2020-03-26

## 2020-03-26 RX ORDER — AMLODIPINE BESYLATE 10 MG/1
TABLET ORAL
Qty: 30 TABLET | Refills: 5 | Status: SHIPPED | OUTPATIENT
Start: 2020-03-26

## 2020-03-26 RX ORDER — ATORVASTATIN CALCIUM 20 MG/1
TABLET, FILM COATED ORAL
Qty: 30 TABLET | Refills: 5 | Status: SHIPPED | OUTPATIENT
Start: 2020-03-26

## 2020-03-26 RX ORDER — METOPROLOL SUCCINATE 100 MG/1
TABLET, EXTENDED RELEASE ORAL
Qty: 30 TABLET | Refills: 5 | Status: SHIPPED | OUTPATIENT
Start: 2020-03-26